# Patient Record
Sex: FEMALE | Race: WHITE | Employment: FULL TIME | URBAN - METROPOLITAN AREA
[De-identification: names, ages, dates, MRNs, and addresses within clinical notes are randomized per-mention and may not be internally consistent; named-entity substitution may affect disease eponyms.]

---

## 2017-06-19 ENCOUNTER — LAB ENCOUNTER (OUTPATIENT)
Dept: LAB | Age: 52
End: 2017-06-19
Attending: INTERNAL MEDICINE
Payer: COMMERCIAL

## 2017-06-19 DIAGNOSIS — R53.83 FATIGUE, UNSPECIFIED TYPE: ICD-10-CM

## 2017-06-19 DIAGNOSIS — E55.9 VITAMIN D DEFICIENCY: ICD-10-CM

## 2017-06-19 DIAGNOSIS — R06.00 DOE (DYSPNEA ON EXERTION): ICD-10-CM

## 2017-06-19 DIAGNOSIS — Z00.00 ANNUAL PHYSICAL EXAM: ICD-10-CM

## 2017-06-19 PROCEDURE — 81001 URINALYSIS AUTO W/SCOPE: CPT

## 2017-06-19 PROCEDURE — 80061 LIPID PANEL: CPT

## 2017-06-19 PROCEDURE — 80053 COMPREHEN METABOLIC PANEL: CPT

## 2017-06-19 PROCEDURE — 85025 COMPLETE CBC W/AUTO DIFF WBC: CPT

## 2017-06-19 PROCEDURE — 36415 COLL VENOUS BLD VENIPUNCTURE: CPT

## 2017-06-19 PROCEDURE — 84443 ASSAY THYROID STIM HORMONE: CPT

## 2017-06-19 PROCEDURE — 82306 VITAMIN D 25 HYDROXY: CPT

## 2017-06-23 ENCOUNTER — OFFICE VISIT (OUTPATIENT)
Dept: INTERNAL MEDICINE CLINIC | Facility: CLINIC | Age: 52
End: 2017-06-23

## 2017-06-23 VITALS
DIASTOLIC BLOOD PRESSURE: 86 MMHG | TEMPERATURE: 98 F | BODY MASS INDEX: 34.09 KG/M2 | SYSTOLIC BLOOD PRESSURE: 150 MMHG | WEIGHT: 190 LBS | HEIGHT: 62.5 IN | HEART RATE: 64 BPM

## 2017-06-23 DIAGNOSIS — G89.29 CHRONIC LEFT-SIDED LOW BACK PAIN WITHOUT SCIATICA: ICD-10-CM

## 2017-06-23 DIAGNOSIS — M54.50 CHRONIC LEFT-SIDED LOW BACK PAIN WITHOUT SCIATICA: ICD-10-CM

## 2017-06-23 DIAGNOSIS — E78.00 HYPERCHOLESTEROLEMIA: Primary | ICD-10-CM

## 2017-06-23 DIAGNOSIS — R53.83 FATIGUE, UNSPECIFIED TYPE: ICD-10-CM

## 2017-06-23 PROCEDURE — 99212 OFFICE O/P EST SF 10 MIN: CPT | Performed by: INTERNAL MEDICINE

## 2017-06-23 PROCEDURE — 99214 OFFICE O/P EST MOD 30 MIN: CPT | Performed by: INTERNAL MEDICINE

## 2017-06-23 NOTE — PROGRESS NOTES
Ad Bryan is a 46year old female. Patient presents with:  Checkup: review labs also left lower back pain    HPI:   Patient presents with:  Checkup: review labs also left lower back pain    Pt feels well.   Pt works as a Physical Therapist.  Pt work tendon reflexes are 1+ bilaterally with an absent left Achilles reflex. ASSESSMENT AND PLAN:   There are no diagnoses linked to this encounter.     Hypercholesterolemia  (primary encounter diagnosis)  Fatigue, unspecified type  Chronic left-sided low back

## 2018-02-04 ENCOUNTER — TELEPHONE (OUTPATIENT)
Dept: INTERNAL MEDICINE CLINIC | Facility: CLINIC | Age: 53
End: 2018-02-04

## 2018-02-04 NOTE — TELEPHONE ENCOUNTER
Telephone call to pt and message left that her Vitamin D level was low at 24. Pt should take Vitamin D 1000 units daily.

## 2018-04-06 ENCOUNTER — OFFICE VISIT (OUTPATIENT)
Dept: OBGYN CLINIC | Facility: CLINIC | Age: 53
End: 2018-04-06

## 2018-04-06 VITALS
HEIGHT: 63 IN | WEIGHT: 188 LBS | HEART RATE: 74 BPM | SYSTOLIC BLOOD PRESSURE: 135 MMHG | DIASTOLIC BLOOD PRESSURE: 87 MMHG | BODY MASS INDEX: 33.31 KG/M2

## 2018-04-06 DIAGNOSIS — Z12.31 ENCOUNTER FOR SCREENING MAMMOGRAM FOR BREAST CANCER: ICD-10-CM

## 2018-04-06 DIAGNOSIS — Z01.419 WELL WOMAN EXAM WITH ROUTINE GYNECOLOGICAL EXAM: Primary | ICD-10-CM

## 2018-04-06 PROCEDURE — 99386 PREV VISIT NEW AGE 40-64: CPT | Performed by: OBSTETRICS & GYNECOLOGY

## 2018-04-06 RX ORDER — BIOTIN 1 MG
1 TABLET ORAL DAILY
COMMUNITY
End: 2018-05-18

## 2018-04-06 NOTE — PROGRESS NOTES
Gerald Ying is a 46year old female  No LMP recorded. Patient has had an ablation. here for annual exam.       Seen [facility] in past.  Last pap . History of TL and Novasure endometrial ablation. No periods. Mild hot flashes.     No gyne is Sitting, Cuff Size: adult)   Pulse 74   Ht 5' 3\" (1.6 m)   Wt 188 lb (85.3 kg)   Breastfeeding? No   BMI 33.30 kg/m²   Wt Readings from Last 2 Encounters:  04/06/18 : 188 lb (85.3 kg)  06/23/17 : 190 lb (86.2 kg)    Body mass index is 33.3 kg/m².     Const

## 2018-04-27 NOTE — PROGRESS NOTES
[facility] [place]  [address] [place] [address]  [address]  [place] [address]  Phone: [phone]  Fax: [phone]    11/14/17    Mason Beatty  [address]  [place] [address]      To whom it may concern:     We recently received a call from your pharmacy requesting a refill of your medication.    A review of your chart indicates that an appointment is required with your provider for an Annual Physical with labs. Your last physical was on 9/13/2016. Please call the clinic at [phone] to schedule your appointment.    We have authorized one refill of your medication to allow time for you to schedule your appointment.    Taking care of your health is important to us, and ongoing visits with your provider are vital to your care.  We look forward to seeing you in the near future.          Sincerely,      Terry Mcmanus MD/ Care Team             Letter sent

## 2018-05-05 ENCOUNTER — HOSPITAL ENCOUNTER (OUTPATIENT)
Dept: MAMMOGRAPHY | Facility: HOSPITAL | Age: 53
Discharge: HOME | End: 2018-05-05
Attending: OBSTETRICS & GYNECOLOGY
Payer: COMMERCIAL

## 2018-05-05 DIAGNOSIS — Z12.31 ENCOUNTER FOR SCREENING MAMMOGRAM FOR BREAST CANCER: ICD-10-CM

## 2018-05-05 PROCEDURE — 77063 BREAST TOMOSYNTHESIS BI: CPT | Performed by: OBSTETRICS & GYNECOLOGY

## 2018-05-05 PROCEDURE — 77067 SCR MAMMO BI INCL CAD: CPT | Performed by: OBSTETRICS & GYNECOLOGY

## 2018-05-17 ENCOUNTER — APPOINTMENT (OUTPATIENT)
Dept: LAB | Age: 53
End: 2018-05-17
Attending: INTERNAL MEDICINE
Payer: COMMERCIAL

## 2018-05-17 DIAGNOSIS — E78.00 HYPERCHOLESTEROLEMIA: ICD-10-CM

## 2018-05-17 PROCEDURE — 36415 COLL VENOUS BLD VENIPUNCTURE: CPT

## 2018-05-17 PROCEDURE — 80061 LIPID PANEL: CPT

## 2018-05-18 ENCOUNTER — OFFICE VISIT (OUTPATIENT)
Dept: INTERNAL MEDICINE CLINIC | Facility: CLINIC | Age: 53
End: 2018-05-18

## 2018-05-18 VITALS
OXYGEN SATURATION: 98 % | DIASTOLIC BLOOD PRESSURE: 86 MMHG | SYSTOLIC BLOOD PRESSURE: 140 MMHG | HEART RATE: 68 BPM | BODY MASS INDEX: 32.96 KG/M2 | HEIGHT: 63 IN | WEIGHT: 186 LBS | TEMPERATURE: 98 F

## 2018-05-18 DIAGNOSIS — Z00.00 ANNUAL PHYSICAL EXAM: ICD-10-CM

## 2018-05-18 DIAGNOSIS — E55.9 VITAMIN D DEFICIENCY: ICD-10-CM

## 2018-05-18 DIAGNOSIS — E78.00 HYPERCHOLESTEROLEMIA: Primary | ICD-10-CM

## 2018-05-18 DIAGNOSIS — R53.83 FATIGUE, UNSPECIFIED TYPE: ICD-10-CM

## 2018-05-18 PROCEDURE — 99212 OFFICE O/P EST SF 10 MIN: CPT | Performed by: INTERNAL MEDICINE

## 2018-05-18 PROCEDURE — 99214 OFFICE O/P EST MOD 30 MIN: CPT | Performed by: INTERNAL MEDICINE

## 2018-05-18 RX ORDER — ATORVASTATIN CALCIUM 10 MG/1
10 TABLET, FILM COATED ORAL NIGHTLY
Qty: 90 TABLET | Refills: 3 | Status: SHIPPED | OUTPATIENT
Start: 2018-05-18 | End: 2019-03-01

## 2018-05-18 RX ORDER — IBUPROFEN 600 MG/1
TABLET ORAL
COMMUNITY
Start: 2018-04-27 | End: 2021-04-05

## 2018-05-18 NOTE — PROGRESS NOTES
Colby Zelaya is a 48year old female. Patient presents with:  Checkup: Results for lipid panel test  Hyperlipidemia    HPI:   Patient presents with:  Checkup: Results for lipid panel test  Hyperlipidemia    Patient feels well.   She is here for follow- distress  HEENT: normal oropharynx, normal TM's. Ears are normal. Eyes are normal  NECK: supple,no lymphadenopathy or masses, no bruits  CHEST: Patient's breasts were not examined today.   LUNGS: clear to auscultation  CARDIO: RRR, normal S1S2, without murm

## 2018-05-18 NOTE — PATIENT INSTRUCTIONS
1.  Patient is to watch her diet more closely and attempt to lose weight. I will give the patient a copy of the American Heart Association diet to assist her. 2.  I will start the patient on Lipitor 10 mg orally which should be taken at night.   3.  José

## 2019-02-15 ENCOUNTER — LAB ENCOUNTER (OUTPATIENT)
Dept: LAB | Age: 54
End: 2019-02-15
Attending: INTERNAL MEDICINE
Payer: COMMERCIAL

## 2019-02-15 DIAGNOSIS — E55.9 VITAMIN D DEFICIENCY: ICD-10-CM

## 2019-02-15 DIAGNOSIS — E78.00 HYPERCHOLESTEROLEMIA: ICD-10-CM

## 2019-02-15 DIAGNOSIS — Z00.00 ANNUAL PHYSICAL EXAM: ICD-10-CM

## 2019-02-15 DIAGNOSIS — R53.83 FATIGUE, UNSPECIFIED TYPE: ICD-10-CM

## 2019-02-15 LAB
ALBUMIN SERPL-MCNC: 3.9 G/DL (ref 3.4–5)
ALBUMIN/GLOB SERPL: 1.1 {RATIO} (ref 1–2)
ALP LIVER SERPL-CCNC: 76 U/L (ref 41–108)
ALT SERPL-CCNC: 31 U/L (ref 13–56)
ANION GAP SERPL CALC-SCNC: 7 MMOL/L (ref 0–18)
AST SERPL-CCNC: 16 U/L (ref 15–37)
BACTERIA UR QL AUTO: NEGATIVE /HPF
BASOPHILS # BLD AUTO: 0.04 X10(3) UL (ref 0–0.2)
BASOPHILS NFR BLD AUTO: 0.6 %
BILIRUB SERPL-MCNC: 0.4 MG/DL (ref 0.1–2)
BILIRUB UR QL: NEGATIVE
BUN BLD-MCNC: 23 MG/DL (ref 7–18)
BUN/CREAT SERPL: 27.4 (ref 10–20)
CALCIUM BLD-MCNC: 9.2 MG/DL (ref 8.5–10.1)
CHLORIDE SERPL-SCNC: 107 MMOL/L (ref 98–107)
CHOLEST SMN-MCNC: 258 MG/DL (ref ?–200)
CLARITY UR: CLEAR
CO2 SERPL-SCNC: 27 MMOL/L (ref 21–32)
COLOR UR: YELLOW
CREAT BLD-MCNC: 0.84 MG/DL (ref 0.55–1.02)
DEPRECATED RDW RBC AUTO: 43.8 FL (ref 35.1–46.3)
EOSINOPHIL # BLD AUTO: 0.1 X10(3) UL (ref 0–0.7)
EOSINOPHIL NFR BLD AUTO: 1.4 %
ERYTHROCYTE [DISTWIDTH] IN BLOOD BY AUTOMATED COUNT: 13.1 % (ref 11–15)
GLOBULIN PLAS-MCNC: 3.6 G/DL (ref 2.8–4.4)
GLUCOSE BLD-MCNC: 89 MG/DL (ref 70–99)
GLUCOSE UR-MCNC: NEGATIVE MG/DL
HCT VFR BLD AUTO: 43.2 % (ref 35–48)
HDLC SERPL-MCNC: 122 MG/DL (ref 40–59)
HGB BLD-MCNC: 14 G/DL (ref 12–16)
HGB UR QL STRIP.AUTO: NEGATIVE
HYALINE CASTS #/AREA URNS AUTO: 1 /LPF
IMM GRANULOCYTES # BLD AUTO: 0.02 X10(3) UL (ref 0–1)
IMM GRANULOCYTES NFR BLD: 0.3 %
KETONES UR-MCNC: NEGATIVE MG/DL
LDLC SERPL CALC-MCNC: 123 MG/DL (ref ?–100)
LYMPHOCYTES # BLD AUTO: 1.89 X10(3) UL (ref 1–4)
LYMPHOCYTES NFR BLD AUTO: 27 %
M PROTEIN MFR SERPL ELPH: 7.5 G/DL (ref 6.4–8.2)
MCH RBC QN AUTO: 29.6 PG (ref 26–34)
MCHC RBC AUTO-ENTMCNC: 32.4 G/DL (ref 31–37)
MCV RBC AUTO: 91.3 FL (ref 80–100)
MONOCYTES # BLD AUTO: 0.5 X10(3) UL (ref 0.1–1)
MONOCYTES NFR BLD AUTO: 7.2 %
NEUTROPHILS # BLD AUTO: 4.44 X10 (3) UL (ref 1.5–7.7)
NEUTROPHILS # BLD AUTO: 4.44 X10(3) UL (ref 1.5–7.7)
NEUTROPHILS NFR BLD AUTO: 63.5 %
NITRITE UR QL STRIP.AUTO: NEGATIVE
NONHDLC SERPL-MCNC: 136 MG/DL (ref ?–130)
OSMOLALITY SERPL CALC.SUM OF ELEC: 295 MOSM/KG (ref 275–295)
PH UR: 5 [PH] (ref 5–8)
PLATELET # BLD AUTO: 300 10(3)UL (ref 150–450)
POTASSIUM SERPL-SCNC: 4.7 MMOL/L (ref 3.5–5.1)
PROT UR-MCNC: NEGATIVE MG/DL
RBC # BLD AUTO: 4.73 X10(6)UL (ref 3.8–5.3)
RBC #/AREA URNS AUTO: 0 /HPF
SODIUM SERPL-SCNC: 141 MMOL/L (ref 136–145)
SP GR UR STRIP: 1.03 (ref 1–1.03)
TRIGL SERPL-MCNC: 63 MG/DL (ref 30–149)
TSI SER-ACNC: 1.16 MIU/ML (ref 0.36–3.74)
UROBILINOGEN UR STRIP-ACNC: <2
VIT C UR-MCNC: NEGATIVE MG/DL
WBC # BLD AUTO: 7 X10(3) UL (ref 4–11)
WBC #/AREA URNS AUTO: 1 /HPF

## 2019-02-15 PROCEDURE — 36415 COLL VENOUS BLD VENIPUNCTURE: CPT

## 2019-02-15 PROCEDURE — 81001 URINALYSIS AUTO W/SCOPE: CPT

## 2019-02-15 PROCEDURE — 80061 LIPID PANEL: CPT

## 2019-02-15 PROCEDURE — 82306 VITAMIN D 25 HYDROXY: CPT

## 2019-02-15 PROCEDURE — 84443 ASSAY THYROID STIM HORMONE: CPT

## 2019-02-15 PROCEDURE — 85025 COMPLETE CBC W/AUTO DIFF WBC: CPT

## 2019-02-15 PROCEDURE — 80053 COMPREHEN METABOLIC PANEL: CPT

## 2019-02-18 LAB — 25(OH)D3 SERPL-MCNC: 25 NG/ML (ref 30–100)

## 2019-03-01 ENCOUNTER — OFFICE VISIT (OUTPATIENT)
Dept: INTERNAL MEDICINE CLINIC | Facility: CLINIC | Age: 54
End: 2019-03-01
Payer: COMMERCIAL

## 2019-03-01 VITALS
WEIGHT: 189 LBS | SYSTOLIC BLOOD PRESSURE: 130 MMHG | BODY MASS INDEX: 33.49 KG/M2 | OXYGEN SATURATION: 98 % | TEMPERATURE: 98 F | HEIGHT: 63 IN | HEART RATE: 72 BPM | DIASTOLIC BLOOD PRESSURE: 86 MMHG

## 2019-03-01 DIAGNOSIS — E78.00 HYPERCHOLESTEROLEMIA: Primary | ICD-10-CM

## 2019-03-01 DIAGNOSIS — R07.9 CHEST PAIN, UNSPECIFIED TYPE: ICD-10-CM

## 2019-03-01 DIAGNOSIS — E55.9 VITAMIN D DEFICIENCY: ICD-10-CM

## 2019-03-01 PROCEDURE — 99214 OFFICE O/P EST MOD 30 MIN: CPT | Performed by: INTERNAL MEDICINE

## 2019-03-01 PROCEDURE — 99212 OFFICE O/P EST SF 10 MIN: CPT | Performed by: INTERNAL MEDICINE

## 2019-03-01 PROCEDURE — 93005 ELECTROCARDIOGRAM TRACING: CPT | Performed by: INTERNAL MEDICINE

## 2019-03-01 PROCEDURE — 93000 ELECTROCARDIOGRAM COMPLETE: CPT | Performed by: INTERNAL MEDICINE

## 2019-03-01 NOTE — PROGRESS NOTES
eGrald Ying is a 48year old female. Patient presents with:  Checkup: LOV 5/18/18. Labs completed 2/15. To review lab results and discuss diet changes. Pt realized she is sensitive to bread & dairy and has since cut this out of her diet.    Hyperlipid by mouth daily. Disp: 100 tablet Rfl: 3      Past Medical History:   Diagnosis Date   • Menometrorrhagia 2010      Social History:  Social History    Tobacco Use      Smoking status: Never Smoker      Smokeless tobacco: Never Used    Alcohol use:  Yes activity. I will see the patient back in 1 year. 2. Vitamin D deficiency  Patient's vitamin D level is low at 25. Patient has been taking vitamin D 1000 units daily. I will have her increase her vitamin D to 2000 units orally daily.     3.  Chest pain

## 2019-03-01 NOTE — PATIENT INSTRUCTIONS
1.  She is to continue her current diet, medication and activity. 2.  Patient's EKG is normal today. Patient's cardiovascular risk calculation shows her to have a 1.2% probability of a cardiovascular event in the next 10 years. This is a low percentage.

## 2021-03-06 ENCOUNTER — IMMUNIZATION (OUTPATIENT)
Dept: LAB | Facility: HOSPITAL | Age: 56
End: 2021-03-06
Attending: EMERGENCY MEDICINE

## 2021-03-06 DIAGNOSIS — Z23 NEED FOR VACCINATION: Primary | ICD-10-CM

## 2021-03-06 PROCEDURE — 0011A SARSCOV2 VAC 100MCG/0.5ML IM: CPT

## 2021-04-03 ENCOUNTER — IMMUNIZATION (OUTPATIENT)
Dept: LAB | Facility: HOSPITAL | Age: 56
End: 2021-04-03
Attending: EMERGENCY MEDICINE

## 2021-04-03 DIAGNOSIS — Z23 NEED FOR VACCINATION: Primary | ICD-10-CM

## 2021-04-03 PROCEDURE — 0012A SARSCOV2 VAC 100MCG/0.5ML IM: CPT

## 2021-04-05 ENCOUNTER — OFFICE VISIT (OUTPATIENT)
Dept: INTERNAL MEDICINE CLINIC | Facility: CLINIC | Age: 56
End: 2021-04-05
Payer: COMMERCIAL

## 2021-04-05 VITALS
OXYGEN SATURATION: 98 % | HEIGHT: 63 IN | SYSTOLIC BLOOD PRESSURE: 130 MMHG | HEART RATE: 108 BPM | TEMPERATURE: 98 F | BODY MASS INDEX: 32.67 KG/M2 | DIASTOLIC BLOOD PRESSURE: 80 MMHG | WEIGHT: 184.38 LBS

## 2021-04-05 DIAGNOSIS — E78.00 HYPERCHOLESTEROLEMIA: Primary | ICD-10-CM

## 2021-04-05 DIAGNOSIS — Z00.00 ANNUAL PHYSICAL EXAM: ICD-10-CM

## 2021-04-05 PROCEDURE — 3075F SYST BP GE 130 - 139MM HG: CPT | Performed by: INTERNAL MEDICINE

## 2021-04-05 PROCEDURE — 99214 OFFICE O/P EST MOD 30 MIN: CPT | Performed by: INTERNAL MEDICINE

## 2021-04-05 PROCEDURE — 3079F DIAST BP 80-89 MM HG: CPT | Performed by: INTERNAL MEDICINE

## 2021-04-05 PROCEDURE — 3008F BODY MASS INDEX DOCD: CPT | Performed by: INTERNAL MEDICINE

## 2021-04-05 RX ORDER — IBUPROFEN 200 MG
200 TABLET ORAL EVERY 6 HOURS PRN
COMMUNITY

## 2021-04-05 NOTE — PATIENT INSTRUCTIONS
1.  Patient is to continue her current diet, medication and activity. 2.  Patient is to watch her diet more closely. 3.  I will see the patient back in 1 to 2 months with blood tests, urinalysis and EKG for her annual physical examination.   4.  Patient i

## 2021-04-05 NOTE — PROGRESS NOTES
Shakira Grove is a 54year old female. Patient presents with:  Checkup: health screening done through employer 3/2021-BP elevated  Hyperlipidemia    HPI:   Patient presents with:  Checkup: health screening done through employer 3/2021-BP elevated  Hype Location: Right arm, Patient Position: Sitting, Cuff Size: large)   Pulse 108   Temp 98.4 °F (36.9 °C) (Oral)   Ht 5' 3\" (1.6 m)   Wt 184 lb 6.4 oz (83.6 kg)   SpO2 98%   BMI 32.66 kg/m²   GENERAL: well developed, well nourished in no acute distress  HEEN

## 2021-05-26 ENCOUNTER — OFFICE VISIT (OUTPATIENT)
Dept: OBGYN CLINIC | Facility: CLINIC | Age: 56
End: 2021-05-26
Payer: COMMERCIAL

## 2021-05-26 VITALS
SYSTOLIC BLOOD PRESSURE: 134 MMHG | WEIGHT: 182 LBS | BODY MASS INDEX: 32 KG/M2 | HEART RATE: 109 BPM | DIASTOLIC BLOOD PRESSURE: 86 MMHG

## 2021-05-26 DIAGNOSIS — Z12.4 SCREENING FOR MALIGNANT NEOPLASM OF CERVIX: ICD-10-CM

## 2021-05-26 DIAGNOSIS — Z12.31 SCREENING MAMMOGRAM, ENCOUNTER FOR: ICD-10-CM

## 2021-05-26 DIAGNOSIS — Z01.419 ENCOUNTER FOR GYNECOLOGICAL EXAMINATION WITHOUT ABNORMAL FINDING: Primary | ICD-10-CM

## 2021-05-26 PROCEDURE — 99386 PREV VISIT NEW AGE 40-64: CPT | Performed by: OBSTETRICS & GYNECOLOGY

## 2021-05-26 PROCEDURE — 3075F SYST BP GE 130 - 139MM HG: CPT | Performed by: OBSTETRICS & GYNECOLOGY

## 2021-05-26 PROCEDURE — 3079F DIAST BP 80-89 MM HG: CPT | Performed by: OBSTETRICS & GYNECOLOGY

## 2021-06-03 NOTE — PROGRESS NOTES
HPI/Subjective:   Patient ID: Samir Joshi is a 64year old female. HPI   ( Davi passed in  ). She is amenorrheic post ablation in . No new family or personal medical issues. Scheduled for colonoscopy with Dr Janie Contreras.  She does cardio Tenderness: There is no abdominal tenderness. There is no guarding or rebound. Genitourinary:     Labia:         Right: No rash or lesion. Left: No rash or lesion. Vagina: Normal. No vaginal discharge.       Cervix: No cervical motion t

## 2021-06-08 ENCOUNTER — APPOINTMENT (OUTPATIENT)
Dept: GENERAL RADIOLOGY | Age: 56
End: 2021-06-08
Attending: EMERGENCY MEDICINE
Payer: COMMERCIAL

## 2021-06-08 ENCOUNTER — HOSPITAL ENCOUNTER (OUTPATIENT)
Age: 56
Discharge: HOME | End: 2021-06-08
Payer: COMMERCIAL

## 2021-06-08 ENCOUNTER — TELEPHONE (OUTPATIENT)
Dept: INTERNAL MEDICINE CLINIC | Facility: CLINIC | Age: 56
End: 2021-06-08

## 2021-06-08 VITALS
HEART RATE: 83 BPM | DIASTOLIC BLOOD PRESSURE: 88 MMHG | SYSTOLIC BLOOD PRESSURE: 142 MMHG | TEMPERATURE: 97 F | OXYGEN SATURATION: 98 % | RESPIRATION RATE: 18 BRPM

## 2021-06-08 DIAGNOSIS — S89.92XA INJURY OF LEFT KNEE, INITIAL ENCOUNTER: Primary | ICD-10-CM

## 2021-06-08 DIAGNOSIS — M79.662 PAIN IN LEFT SHIN: ICD-10-CM

## 2021-06-08 PROCEDURE — 99204 OFFICE O/P NEW MOD 45 MIN: CPT

## 2021-06-08 PROCEDURE — 99214 OFFICE O/P EST MOD 30 MIN: CPT

## 2021-06-08 PROCEDURE — 73590 X-RAY EXAM OF LOWER LEG: CPT | Performed by: EMERGENCY MEDICINE

## 2021-06-08 PROCEDURE — 73560 X-RAY EXAM OF KNEE 1 OR 2: CPT | Performed by: EMERGENCY MEDICINE

## 2021-06-08 RX ORDER — METHOCARBAMOL 500 MG/1
500 TABLET, FILM COATED ORAL 3 TIMES DAILY PRN
Qty: 14 TABLET | Refills: 0 | Status: SHIPPED | OUTPATIENT
Start: 2021-06-08 | End: 2021-07-07 | Stop reason: ALTCHOICE

## 2021-06-08 NOTE — TELEPHONE ENCOUNTER
Pt was walking dog and the dog pulled her the wrong way  which caused her to twist knee. Pt states that it is very painful. Pt cant extend her knee and she is not able to put any weight on it. She does not think it is fractured.      Please call and ad

## 2021-06-08 NOTE — ED PROVIDER NOTES
Patient Seen in: [facility]      History   Patient presents with:  Leg or Foot Injury    Stated Complaint: leg pain    HPI/Subjective:   Paula Lazo is a 64year old female here for left knee pain after twisting her knee yesterday Appearance: Normal appearance. She is not ill-appearing. HENT:      Head: Normocephalic. Eyes:      Conjunctiva/sclera: Conjunctivae normal.      Pupils: Pupils are equal, round, and reactive to light.    Pulmonary:      Effort: Pulmonary effort is norm osteoarthritis. Patient does not currently demonstrate complications of pain such as Fx, compartment syndrome, arterial injury, or nerve injury. Splint: Knee immobilizer left knee  Crutches: Patient has at home  Medication: Robaxin, and OTC medications.

## 2021-06-08 NOTE — TELEPHONE ENCOUNTER
Spoke with patient. She reports she twisted her left knee inward while walking her dog. Feels pain to medial kneecap. Reports passive ROM is intact, but she has pain with active ROM, dorsiflexion, and rotating knee medially and laterally.      Recommend UC

## 2021-06-08 NOTE — ED INITIAL ASSESSMENT (HPI)
Pt presents with pain on left knee x 2 days due to injury.  Pt states she injured knee while walking dog yesterday

## 2021-06-09 NOTE — TELEPHONE ENCOUNTER
Dr. Du Bertrand, patient was seen in urgent care in [place] yesterday and prescribed methocarbamol. X-rays were completed. She is scheduled for follow up on July 7. Please advise if a sooner appointment is needed. Thank you.

## 2021-06-09 NOTE — TELEPHONE ENCOUNTER
Telephone call to patient and situation discussed. I told the patient that based on her pain, swelling, and the fact that there appears to be an effusion in her knee I am concerned that she may have torn something such as a cartilage inside her knee.   I i

## 2021-06-30 ENCOUNTER — HOSPITAL ENCOUNTER (OUTPATIENT)
Dept: MAMMOGRAPHY | Age: 56
Discharge: HOME | End: 2021-06-30
Attending: OBSTETRICS & GYNECOLOGY
Payer: COMMERCIAL

## 2021-06-30 DIAGNOSIS — Z12.31 SCREENING MAMMOGRAM, ENCOUNTER FOR: ICD-10-CM

## 2021-06-30 PROCEDURE — 77067 SCR MAMMO BI INCL CAD: CPT | Performed by: OBSTETRICS & GYNECOLOGY

## 2021-06-30 PROCEDURE — 77063 BREAST TOMOSYNTHESIS BI: CPT | Performed by: OBSTETRICS & GYNECOLOGY

## 2021-07-03 ENCOUNTER — LAB ENCOUNTER (OUTPATIENT)
Dept: LAB | Age: 56
End: 2021-07-03
Attending: INTERNAL MEDICINE
Payer: COMMERCIAL

## 2021-07-03 DIAGNOSIS — E78.00 HYPERCHOLESTEROLEMIA: ICD-10-CM

## 2021-07-03 DIAGNOSIS — Z00.00 ANNUAL PHYSICAL EXAM: ICD-10-CM

## 2021-07-03 LAB
ALBUMIN SERPL-MCNC: 3.8 G/DL (ref 3.4–5)
ALBUMIN/GLOB SERPL: 1 {RATIO} (ref 1–2)
ALP LIVER SERPL-CCNC: 79 U/L
ALT SERPL-CCNC: 33 U/L
ANION GAP SERPL CALC-SCNC: 7 MMOL/L (ref 0–18)
AST SERPL-CCNC: 22 U/L (ref 15–37)
BASOPHILS # BLD AUTO: 0.03 X10(3) UL (ref 0–0.2)
BASOPHILS NFR BLD AUTO: 0.6 %
BILIRUB SERPL-MCNC: 0.7 MG/DL (ref 0.1–2)
BILIRUB UR QL: NEGATIVE
BUN BLD-MCNC: 27 MG/DL (ref 7–18)
BUN/CREAT SERPL: 32.9 (ref 10–20)
CALCIUM BLD-MCNC: 9.9 MG/DL (ref 8.5–10.1)
CHLORIDE SERPL-SCNC: 103 MMOL/L (ref 98–112)
CHOLEST SMN-MCNC: 289 MG/DL (ref ?–200)
CLARITY UR: CLEAR
CO2 SERPL-SCNC: 28 MMOL/L (ref 21–32)
COLOR UR: YELLOW
CREAT BLD-MCNC: 0.82 MG/DL
DEPRECATED RDW RBC AUTO: 45.1 FL (ref 35.1–46.3)
EOSINOPHIL # BLD AUTO: 0.09 X10(3) UL (ref 0–0.7)
EOSINOPHIL NFR BLD AUTO: 1.8 %
ERYTHROCYTE [DISTWIDTH] IN BLOOD BY AUTOMATED COUNT: 13.4 % (ref 11–15)
GLOBULIN PLAS-MCNC: 3.9 G/DL (ref 2.8–4.4)
GLUCOSE BLD-MCNC: 88 MG/DL (ref 70–99)
GLUCOSE UR-MCNC: NEGATIVE MG/DL
HCT VFR BLD AUTO: 42.5 %
HDLC SERPL-MCNC: 103 MG/DL (ref 40–59)
HGB BLD-MCNC: 13.5 G/DL
HGB UR QL STRIP.AUTO: NEGATIVE
IMM GRANULOCYTES # BLD AUTO: 0.01 X10(3) UL (ref 0–1)
IMM GRANULOCYTES NFR BLD: 0.2 %
KETONES UR-MCNC: NEGATIVE MG/DL
LDLC SERPL CALC-MCNC: 172 MG/DL (ref ?–100)
LYMPHOCYTES # BLD AUTO: 2.03 X10(3) UL (ref 1–4)
LYMPHOCYTES NFR BLD AUTO: 39.6 %
M PROTEIN MFR SERPL ELPH: 7.7 G/DL (ref 6.4–8.2)
MCH RBC QN AUTO: 29.2 PG (ref 26–34)
MCHC RBC AUTO-ENTMCNC: 31.8 G/DL (ref 31–37)
MCV RBC AUTO: 91.8 FL
MONOCYTES # BLD AUTO: 0.51 X10(3) UL (ref 0.1–1)
MONOCYTES NFR BLD AUTO: 10 %
NEUTROPHILS # BLD AUTO: 2.45 X10 (3) UL (ref 1.5–7.7)
NEUTROPHILS # BLD AUTO: 2.45 X10(3) UL (ref 1.5–7.7)
NEUTROPHILS NFR BLD AUTO: 47.8 %
NITRITE UR QL STRIP.AUTO: NEGATIVE
NONHDLC SERPL-MCNC: 186 MG/DL (ref ?–130)
OSMOLALITY SERPL CALC.SUM OF ELEC: 291 MOSM/KG (ref 275–295)
PATIENT FASTING Y/N/NP: YES
PATIENT FASTING Y/N/NP: YES
PH UR: 7 [PH] (ref 5–8)
PLATELET # BLD AUTO: 304 10(3)UL (ref 150–450)
POTASSIUM SERPL-SCNC: 4 MMOL/L (ref 3.5–5.1)
PROT UR-MCNC: NEGATIVE MG/DL
RBC # BLD AUTO: 4.63 X10(6)UL
SODIUM SERPL-SCNC: 138 MMOL/L (ref 136–145)
SP GR UR STRIP: 1.02 (ref 1–1.03)
TRIGL SERPL-MCNC: 88 MG/DL (ref 30–149)
TSI SER-ACNC: 1.79 MIU/ML (ref 0.36–3.74)
UROBILINOGEN UR STRIP-ACNC: <2
VLDLC SERPL CALC-MCNC: 17 MG/DL (ref 0–30)
WBC # BLD AUTO: 5.1 X10(3) UL (ref 4–11)

## 2021-07-03 PROCEDURE — 85025 COMPLETE CBC W/AUTO DIFF WBC: CPT

## 2021-07-03 PROCEDURE — 81001 URINALYSIS AUTO W/SCOPE: CPT | Performed by: INTERNAL MEDICINE

## 2021-07-03 PROCEDURE — 36415 COLL VENOUS BLD VENIPUNCTURE: CPT

## 2021-07-03 PROCEDURE — 80061 LIPID PANEL: CPT

## 2021-07-03 PROCEDURE — 82306 VITAMIN D 25 HYDROXY: CPT

## 2021-07-03 PROCEDURE — 80053 COMPREHEN METABOLIC PANEL: CPT

## 2021-07-03 PROCEDURE — 84443 ASSAY THYROID STIM HORMONE: CPT

## 2021-07-05 LAB — 25(OH)D3 SERPL-MCNC: 27.3 NG/ML (ref 30–100)

## 2021-07-07 ENCOUNTER — OFFICE VISIT (OUTPATIENT)
Dept: INTERNAL MEDICINE CLINIC | Facility: CLINIC | Age: 56
End: 2021-07-07
Payer: COMMERCIAL

## 2021-07-07 VITALS
OXYGEN SATURATION: 99 % | DIASTOLIC BLOOD PRESSURE: 74 MMHG | SYSTOLIC BLOOD PRESSURE: 126 MMHG | HEART RATE: 92 BPM | WEIGHT: 179 LBS | HEIGHT: 63 IN | BODY MASS INDEX: 31.71 KG/M2

## 2021-07-07 DIAGNOSIS — E78.00 HYPERCHOLESTEROLEMIA: ICD-10-CM

## 2021-07-07 DIAGNOSIS — E55.9 VITAMIN D DEFICIENCY: ICD-10-CM

## 2021-07-07 DIAGNOSIS — Z00.00 ANNUAL PHYSICAL EXAM: Primary | ICD-10-CM

## 2021-07-07 PROCEDURE — 3074F SYST BP LT 130 MM HG: CPT | Performed by: INTERNAL MEDICINE

## 2021-07-07 PROCEDURE — 3008F BODY MASS INDEX DOCD: CPT | Performed by: INTERNAL MEDICINE

## 2021-07-07 PROCEDURE — 99213 OFFICE O/P EST LOW 20 MIN: CPT | Performed by: INTERNAL MEDICINE

## 2021-07-07 PROCEDURE — 99396 PREV VISIT EST AGE 40-64: CPT | Performed by: INTERNAL MEDICINE

## 2021-07-07 PROCEDURE — 3078F DIAST BP <80 MM HG: CPT | Performed by: INTERNAL MEDICINE

## 2021-07-07 RX ORDER — ATORVASTATIN CALCIUM 20 MG/1
20 TABLET, FILM COATED ORAL NIGHTLY
Qty: 90 TABLET | Refills: 3 | Status: SHIPPED | OUTPATIENT
Start: 2021-07-07 | End: 2022-07-07

## 2021-07-07 RX ORDER — VITAMIN B COMPLEX
50 TABLET ORAL DAILY
Qty: 100 TABLET | Refills: 11 | Status: SHIPPED | OUTPATIENT
Start: 2021-07-07 | End: 2021-08-06

## 2021-07-07 NOTE — PROGRESS NOTES
HPI:   Po Arora is a 64year old female who was seen by me for her annual physical examination on July 7, 2021. 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  She feels well. Never used    Alcohol use: Yes      Comment: 1to 3 glass of wine in a week    Drug use: No         REVIEW OF SYSTEMS:   GENERAL: feels well   SKIN: denies any unusual skin lesions  EYES:denies blurred vision or double vision  HEENT: denies nasal congestion to continue her current diet, medication and activity. Patient's cholesterol readings are again elevated. I have given the patient a copy of the American Heart Association diet to assist with her diet.   We also discussed the rationale of placing the edwin

## 2021-07-07 NOTE — PATIENT INSTRUCTIONS
1.  Patient is to continue her current diet, medication and activity. 2.  I will give the patient a copy of the American Heart Association diet to assist with her diet.   3.  We will start the patient Lipitor 20 mg orally to be taken in the evening/at bedt

## 2021-07-12 ENCOUNTER — TELEPHONE (OUTPATIENT)
Dept: GASTROENTEROLOGY | Facility: CLINIC | Age: 56
End: 2021-07-12

## 2021-07-12 ENCOUNTER — OFFICE VISIT (OUTPATIENT)
Dept: GASTROENTEROLOGY | Facility: CLINIC | Age: 56
End: 2021-07-12
Payer: COMMERCIAL

## 2021-07-12 VITALS
BODY MASS INDEX: 31.89 KG/M2 | HEART RATE: 98 BPM | SYSTOLIC BLOOD PRESSURE: 139 MMHG | HEIGHT: 63 IN | DIASTOLIC BLOOD PRESSURE: 90 MMHG | WEIGHT: 180 LBS

## 2021-07-12 DIAGNOSIS — Z12.11 COLON CANCER SCREENING: Primary | ICD-10-CM

## 2021-07-12 DIAGNOSIS — K21.9 GASTROESOPHAGEAL REFLUX DISEASE, UNSPECIFIED WHETHER ESOPHAGITIS PRESENT: ICD-10-CM

## 2021-07-12 DIAGNOSIS — Z12.11 SCREENING FOR COLORECTAL CANCER: Primary | ICD-10-CM

## 2021-07-12 DIAGNOSIS — Z12.12 SCREENING FOR COLORECTAL CANCER: Primary | ICD-10-CM

## 2021-07-12 PROCEDURE — 3080F DIAST BP >= 90 MM HG: CPT | Performed by: INTERNAL MEDICINE

## 2021-07-12 PROCEDURE — 3008F BODY MASS INDEX DOCD: CPT | Performed by: INTERNAL MEDICINE

## 2021-07-12 PROCEDURE — S0285 CNSLT BEFORE SCREEN COLONOSC: HCPCS | Performed by: INTERNAL MEDICINE

## 2021-07-12 PROCEDURE — 3075F SYST BP GE 130 - 139MM HG: CPT | Performed by: INTERNAL MEDICINE

## 2021-07-12 NOTE — PATIENT INSTRUCTIONS
1.  Schedule screening colonoscopy following a split dose MiraLAX/Gatorade preparation and either IV sedation or monitored anesthesia care per scheduling. 2.  Dietary and lifestyle modification for reflux as you are doing.

## 2021-07-12 NOTE — PROGRESS NOTES
HPI/Subjective:   Patient ID: Marvin Hunter is a 64year old female. HPI  The patient is seen at the request of Dr. Dixie Beatty for colorectal cancer screening. She has not had a prior colonoscopy. The patient states that she feels \"good\". appearance. She is well-developed. She is not ill-appearing or toxic-appearing. HENT:      Head: Normocephalic and atraumatic. Mouth/Throat:      Pharynx: No oropharyngeal exudate. Eyes:      General: No scleral icterus.      Conjunctiva/sclera: Co Basophils %      % 0.6   Immature Granulocyte %      % 0.2   Glucose      70 - 99 mg/dL 88   Sodium      136 - 145 mmol/L 138   Potassium      3.5 - 5.1 mmol/L 4.0   Chloride      98 - 112 mmol/L 103   Carbon Dioxide, Total      21.0 - 32.0 mmol/L 28.0 complications of reflux including erosive esophagitis, stricture or Velazquez's esophagus. The patient's risk for Velazquez's esophagus is low.   Upper endoscopy is not mandatory unless symptoms are refractory or alarm symptoms are noted which is not the case

## 2021-07-12 NOTE — TELEPHONE ENCOUNTER
Scheduled for: Colonoscopy 09888  Provider Name: Dr Ajay Terrazas   Date:  Mon 11/01/2021    Location: [facility]   Sedation: IV   Time: 10:30 am   Prep: split Miralax   Meds/Allergies Reconciled?: Latex  Diagnosis with codes: screening Z12.11  Was patient informed

## 2021-10-14 ENCOUNTER — NURSE ONLY (OUTPATIENT)
Dept: LAB | Facility: HOSPITAL | Age: 56
End: 2021-10-14
Attending: PREVENTIVE MEDICINE

## 2021-10-14 ENCOUNTER — TELEPHONE (OUTPATIENT)
Dept: INTERNAL MEDICINE CLINIC | Facility: HOSPITAL | Age: 56
End: 2021-10-14

## 2021-10-14 DIAGNOSIS — Z20.822 SUSPECTED COVID-19 VIRUS INFECTION: ICD-10-CM

## 2021-10-14 DIAGNOSIS — Z20.822 SUSPECTED COVID-19 VIRUS INFECTION: Primary | ICD-10-CM

## 2021-10-14 NOTE — TELEPHONE ENCOUNTER
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 10/19/2021    Did you have close contact with someone on your unit while not wearing a mask? (e.g., during meal breaks):  Yes []   No [x]    If yes, who:   Do you share a workspace? Yes [x]   No []       If yes, with whom?   Clean area, wear PPE  Do y COVID-19 testing ordered: [x] Rapid    [] [employer]    Date test is to be taken:    10/14/2021    []  Outside testing       [x] Manager notified    INSTRUCTIONS PROVIDED:    [x] Employee will schedule testing via [facility]

## 2021-10-14 NOTE — TELEPHONE ENCOUNTER
Results and RTW guidelines:    COVID RESULT:    [x] Viewed by employee in [address] RTW plan and instructions as indicated on triage call. Manager notified. Estimated RTW date:   [] Discussed with employee   [] Unable to reach by phone.   Sent via [employer] RTW PLAN:    [] RTW 10 days with clearance from [facility]- call for appt 1-2 days prior to RTW date OR when feeling well enough to RTW (see guidelines above)  [x] RTW immediately, continue to monitor for sx  [] RTW when sx improve; must be fever free for 24 h

## 2021-10-27 RX ORDER — SODIUM CHLORIDE 0.9 % (FLUSH) 0.9 %
10 SYRINGE (ML) INJECTION AS NEEDED
Status: CANCELLED | OUTPATIENT
Start: 2021-10-27

## 2021-10-27 RX ORDER — SODIUM CHLORIDE, SODIUM LACTATE, POTASSIUM CHLORIDE, CALCIUM CHLORIDE 600; 310; 30; 20 MG/100ML; MG/100ML; MG/100ML; MG/100ML
INJECTION, SOLUTION INTRAVENOUS CONTINUOUS
Status: CANCELLED | OUTPATIENT
Start: 2021-10-27

## 2021-10-27 RX ORDER — MIDAZOLAM HYDROCHLORIDE 1 MG/ML
1 INJECTION INTRAMUSCULAR; INTRAVENOUS EVERY 5 MIN PRN
Status: CANCELLED | OUTPATIENT
Start: 2021-10-27

## 2021-10-27 NOTE — PAT NURSING NOTE
Medications, procedure date/arrival time,  requirements and [facility] location reviewed with patient. Address provided. .Patient verbalizes understanding.

## 2021-10-29 ENCOUNTER — LAB ENCOUNTER (OUTPATIENT)
Dept: LAB | Facility: HOSPITAL | Age: 56
End: 2021-10-29
Attending: INTERNAL MEDICINE
Payer: COMMERCIAL

## 2021-10-29 DIAGNOSIS — Z01.818 PRE-OP TESTING: ICD-10-CM

## 2021-11-01 ENCOUNTER — ANESTHESIA EVENT (OUTPATIENT)
Dept: ENDOSCOPY | Facility: HOSPITAL | Age: 56
End: 2021-11-01
Payer: COMMERCIAL

## 2021-11-01 ENCOUNTER — HOSPITAL ENCOUNTER (OUTPATIENT)
Facility: HOSPITAL | Age: 56
Setting detail: HOSPITAL OUTPATIENT SURGERY
Discharge: HOME | End: 2021-11-01
Attending: INTERNAL MEDICINE | Admitting: INTERNAL MEDICINE
Payer: COMMERCIAL

## 2021-11-01 ENCOUNTER — ANESTHESIA (OUTPATIENT)
Dept: ENDOSCOPY | Facility: HOSPITAL | Age: 56
End: 2021-11-01
Payer: COMMERCIAL

## 2021-11-01 VITALS
WEIGHT: 170 LBS | TEMPERATURE: 98 F | HEART RATE: 79 BPM | OXYGEN SATURATION: 98 % | DIASTOLIC BLOOD PRESSURE: 88 MMHG | RESPIRATION RATE: 22 BRPM | HEIGHT: 66 IN | SYSTOLIC BLOOD PRESSURE: 124 MMHG | BODY MASS INDEX: 27.32 KG/M2

## 2021-11-01 DIAGNOSIS — Z12.11 COLON CANCER SCREENING: ICD-10-CM

## 2021-11-01 DIAGNOSIS — Z01.818 PRE-OP TESTING: Primary | ICD-10-CM

## 2021-11-01 PROCEDURE — 0DBN8ZX EXCISION OF SIGMOID COLON, VIA NATURAL OR ARTIFICIAL OPENING ENDOSCOPIC, DIAGNOSTIC: ICD-10-PCS | Performed by: INTERNAL MEDICINE

## 2021-11-01 PROCEDURE — 0DBK8ZX EXCISION OF ASCENDING COLON, VIA NATURAL OR ARTIFICIAL OPENING ENDOSCOPIC, DIAGNOSTIC: ICD-10-PCS | Performed by: INTERNAL MEDICINE

## 2021-11-01 PROCEDURE — 45385 COLONOSCOPY W/LESION REMOVAL: CPT | Performed by: INTERNAL MEDICINE

## 2021-11-01 RX ORDER — SODIUM CHLORIDE, SODIUM LACTATE, POTASSIUM CHLORIDE, CALCIUM CHLORIDE 600; 310; 30; 20 MG/100ML; MG/100ML; MG/100ML; MG/100ML
INJECTION, SOLUTION INTRAVENOUS CONTINUOUS
Status: DISCONTINUED | OUTPATIENT
Start: 2021-11-01 | End: 2021-11-01

## 2021-11-01 RX ORDER — NALOXONE HYDROCHLORIDE 0.4 MG/ML
80 INJECTION, SOLUTION INTRAMUSCULAR; INTRAVENOUS; SUBCUTANEOUS AS NEEDED
Status: DISCONTINUED | OUTPATIENT
Start: 2021-11-01 | End: 2021-11-01

## 2021-11-01 RX ADMIN — SODIUM CHLORIDE, SODIUM LACTATE, POTASSIUM CHLORIDE, CALCIUM CHLORIDE: 600; 310; 30; 20 INJECTION, SOLUTION INTRAVENOUS at 11:13:00

## 2021-11-01 RX ADMIN — SODIUM CHLORIDE, SODIUM LACTATE, POTASSIUM CHLORIDE, CALCIUM CHLORIDE: 600; 310; 30; 20 INJECTION, SOLUTION INTRAVENOUS at 11:51:00

## 2021-11-01 NOTE — ANESTHESIA PREPROCEDURE EVALUATION
Anesthesia PreOp Note    HPI:     Wilmer Espinoza is a 64year old female who presents for preoperative consultation requested by: Renay Hopkins MD    Date of Surgery: 11/1/2021    Procedure(s):  COLONOSCOPY  Indication: Colon cancer screeni History      Marital status:       Spouse name: Not on file      Number of children: Not on file      Years of education: Not on file      Highest education level: Not on file    Occupational History      Not on file    Tobacco Use      Smoking stat Status: Not on file  Intimate Partner Violence:       Fear of Current or Ex-Partner: Not on file      Emotionally Abused: Not on file      Physically Abused: Not on file      Sexually Abused: Not on file  Housing Stability:       Unable to Pay for Housing

## 2021-11-01 NOTE — ANESTHESIA POSTPROCEDURE EVALUATION
Patient: Gilberto Console    Procedure Summary     Date: 11/01/21 Room / Location: [address] [place] [address] ENDOSCOPY 04 / [address] [place] [address] ENDOSCOPY    Anesthesia Start: 8672 Anesthesia Stop: 1150    Procedure: COLONOSCOPY (N/A ) Diagnosis:       Colon cancer screening      (Polyps)

## 2021-11-01 NOTE — OPERATIVE REPORT
[facility] Endoscopy Report      Date of Procedure:  11/01/21      Preoperative Diagnosis:  Colorectal cancer screening      Postoperative Diagnosis:  Colon polyps      Procedure:    Colonoscopy with polypectomy      Surgeon:  Noemi Cope complication. Impression:  1. Colon polyps  2. Otherwise normal colonoscopy to the terminal ileum    Recommendations:  1. Standard post polypectomy instructions given. 2.  Follow-up biopsy results.   3.  Probable surveillance colonoscopy in 3 years

## 2021-11-01 NOTE — H&P
History & Physical Examination    Patient Name: Preet Fang  MRN: [id number]  CSN: [id number]  YOB: 1965    Diagnosis: Colorectal cancer screening      atorvastatin (LIPITOR) 20 MG Oral Tab, Take 1 tablet (20 mg total) by mouth nig above.    Suzanna Herrera MD  11/1/2021  11:09 AM

## 2021-11-03 ENCOUNTER — TELEPHONE (OUTPATIENT)
Dept: GASTROENTEROLOGY | Facility: CLINIC | Age: 56
End: 2021-11-03

## 2021-11-03 NOTE — TELEPHONE ENCOUNTER
Health maintenance updated. Last colonoscopy done 11/1/21. 3 year recall placed into [facility] Outreach, next due on 11/1/24 per Dr. Wilber Marte.

## 2021-11-03 NOTE — TELEPHONE ENCOUNTER
----- Message from Katerine Cortez MD sent at 11/2/2021  6:13 PM CDT -----  As per ASHLEY parameters I left a message with the patient's , Tanika Farfan. She had #2 adenomatous polyps removed.  Although the sigmoid polyp was histologically sized at 7 mm this

## 2022-03-16 ENCOUNTER — TELEPHONE (OUTPATIENT)
Dept: INTERNAL MEDICINE CLINIC | Facility: CLINIC | Age: 57
End: 2022-03-16

## 2022-03-16 NOTE — TELEPHONE ENCOUNTER
Note:  Dr Leela Brandt now has two openings on Monday (cancellations)  if needed when patient calls back

## 2022-03-16 NOTE — TELEPHONE ENCOUNTER
Noted.  Micheal Tong for patient to see me on Monday, March 21, as currently scheduled. If patient feels her pain is getting worse or she has more problems she can see one of my associates on Thursday or Friday if she wishes. In the meantime patient may apply warm compresses to the area of the neck where she has pain. Patient may also use Tylenol or ibuprofen as necessary for pain. I will forward this message to nursing.   Thank you!!

## 2022-03-16 NOTE — TELEPHONE ENCOUNTER
Unable to leave a message on Mobile # d/t mailbox being full. Home # rang twice then busy tone was heard.

## 2022-03-16 NOTE — TELEPHONE ENCOUNTER
Pt send mychart note to schedule appt for the following:    \"My left side of the neck is swollen and tender to touch. I would like it to be checked out. \"    No appointments available this week or Monday  Can pt be added to schedule?     Tasked to nursing

## 2022-03-16 NOTE — TELEPHONE ENCOUNTER
To Dr. LINDA to please advise----  Pt called back, reports L. side neck lymph node appears swollen and tender to touch. Denies sore throat, ear pain, cough, difficulty or pain when swallowing, fever, chills, body aches, nasal congestion. Next opening on 3/21 Monday, appt made for 1PM per pt request. To Dr. LINDA to please advise if OK to wait or if pt should reschedule with another [facility] physician to be seen sooner.      Please send patient a [employer] message with Dr. Tony Puri response per patient request.

## 2022-03-16 NOTE — TELEPHONE ENCOUNTER
I spoke with patient and relayed Dr. Merlyn Max message. She verbalized understanding. She will keep her appointment as scheduled. She denies any pain at this time. Invited her to call back with any questions or concerns.

## 2022-03-17 ENCOUNTER — LAB ENCOUNTER (OUTPATIENT)
Dept: LAB | Age: 57
End: 2022-03-17
Attending: INTERNAL MEDICINE
Payer: COMMERCIAL

## 2022-03-17 DIAGNOSIS — E78.00 HYPERCHOLESTEROLEMIA: ICD-10-CM

## 2022-03-17 DIAGNOSIS — E55.9 VITAMIN D DEFICIENCY: ICD-10-CM

## 2022-03-17 LAB
ALT SERPL-CCNC: 36 U/L
AST SERPL-CCNC: 20 U/L (ref 15–37)
CHOLEST SERPL-MCNC: 305 MG/DL (ref ?–200)
FASTING PATIENT LIPID ANSWER: YES
HDLC SERPL-MCNC: 112 MG/DL (ref 40–59)
LDLC SERPL CALC-MCNC: 169 MG/DL (ref ?–100)
NONHDLC SERPL-MCNC: 193 MG/DL (ref ?–130)
TRIGL SERPL-MCNC: 141 MG/DL (ref 30–149)
VIT D+METAB SERPL-MCNC: 20.7 NG/ML (ref 30–100)
VLDLC SERPL CALC-MCNC: 28 MG/DL (ref 0–30)

## 2022-03-17 PROCEDURE — 36415 COLL VENOUS BLD VENIPUNCTURE: CPT

## 2022-03-17 PROCEDURE — 84460 ALANINE AMINO (ALT) (SGPT): CPT

## 2022-03-17 PROCEDURE — 82306 VITAMIN D 25 HYDROXY: CPT

## 2022-03-17 PROCEDURE — 80061 LIPID PANEL: CPT

## 2022-03-17 PROCEDURE — 84450 TRANSFERASE (AST) (SGOT): CPT

## 2022-03-21 ENCOUNTER — OFFICE VISIT (OUTPATIENT)
Dept: INTERNAL MEDICINE CLINIC | Facility: CLINIC | Age: 57
End: 2022-03-21
Payer: COMMERCIAL

## 2022-03-21 ENCOUNTER — TELEPHONE (OUTPATIENT)
Dept: INTERNAL MEDICINE CLINIC | Facility: CLINIC | Age: 57
End: 2022-03-21

## 2022-03-21 VITALS
SYSTOLIC BLOOD PRESSURE: 130 MMHG | HEART RATE: 96 BPM | HEIGHT: 66 IN | TEMPERATURE: 98 F | OXYGEN SATURATION: 98 % | DIASTOLIC BLOOD PRESSURE: 80 MMHG | BODY MASS INDEX: 28.61 KG/M2 | WEIGHT: 178 LBS

## 2022-03-21 DIAGNOSIS — R22.1 LUMP IN NECK: Primary | ICD-10-CM

## 2022-03-21 DIAGNOSIS — E55.9 VITAMIN D DEFICIENCY: ICD-10-CM

## 2022-03-21 DIAGNOSIS — E78.00 HYPERCHOLESTEROLEMIA: ICD-10-CM

## 2022-03-21 PROCEDURE — 99214 OFFICE O/P EST MOD 30 MIN: CPT | Performed by: INTERNAL MEDICINE

## 2022-03-21 PROCEDURE — 3079F DIAST BP 80-89 MM HG: CPT | Performed by: INTERNAL MEDICINE

## 2022-03-21 PROCEDURE — 3075F SYST BP GE 130 - 139MM HG: CPT | Performed by: INTERNAL MEDICINE

## 2022-03-21 PROCEDURE — 3008F BODY MASS INDEX DOCD: CPT | Performed by: INTERNAL MEDICINE

## 2022-03-21 RX ORDER — ATORVASTATIN CALCIUM 20 MG/1
20 TABLET, FILM COATED ORAL NIGHTLY
Qty: 90 TABLET | Refills: 3 | Status: SHIPPED | OUTPATIENT
Start: 2022-03-21 | End: 2023-03-21

## 2022-03-21 NOTE — PATIENT INSTRUCTIONS
1.  Patient is to continue her current diet, medication and activity. 2.  I will refer the patient to see Dr. Abel Bradley for ENT evaluation of the left neck lump/mass. I am not sure whether this is part of the parotid gland or if it is a lymph node. 3.  I will refill the patient's Lipitor at 20 mg orally daily. 4.  Patient is to take vitamin D 2000 units orally daily. 5.  I will plan to see the patient back in about a month. 6.  Patient will need a repeat lipid panel, AST, ALT and vitamin D level in about 3 months.

## 2022-03-22 NOTE — TELEPHONE ENCOUNTER
Celine Colunga,  Mrs. Juan Luis Dorado was seen by me today for evaluation of a swelling/nodule/mass that she is noticed in her left upper anterior neck region. The area involved is just lateral to the left mandible. I am uncertain whether this is part of the parotid gland or is a lymph node. I have referred Mrs. Siva Knutson to see you for evaluation. Thank you for your assistance!     Dr Cathren Cowden

## 2022-04-05 ENCOUNTER — OFFICE VISIT (OUTPATIENT)
Dept: OTOLARYNGOLOGY | Facility: CLINIC | Age: 57
End: 2022-04-05
Payer: COMMERCIAL

## 2022-04-05 VITALS — HEIGHT: 66 IN | WEIGHT: 178 LBS | BODY MASS INDEX: 28.61 KG/M2

## 2022-04-05 DIAGNOSIS — R22.1 NECK MASS: Primary | ICD-10-CM

## 2022-04-05 PROCEDURE — 99213 OFFICE O/P EST LOW 20 MIN: CPT | Performed by: SPECIALIST

## 2022-04-05 PROCEDURE — 3008F BODY MASS INDEX DOCD: CPT | Performed by: SPECIALIST

## 2022-04-05 RX ORDER — AMOXICILLIN AND CLAVULANATE POTASSIUM 875; 125 MG/1; MG/1
1 TABLET, FILM COATED ORAL EVERY 12 HOURS
Qty: 20 TABLET | Refills: 0 | Status: SHIPPED | OUTPATIENT
Start: 2022-04-05

## 2022-04-05 NOTE — PATIENT INSTRUCTIONS
You have a left neck mass. 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  A CT scan was ordered to better evaluate this. I also placed on a trial of Augmentin.

## 2022-04-15 ENCOUNTER — HOSPITAL ENCOUNTER (OUTPATIENT)
Dept: CT IMAGING | Age: 57
Discharge: HOME | End: 2022-04-15
Attending: SPECIALIST
Payer: COMMERCIAL

## 2022-04-15 DIAGNOSIS — R22.1 NECK MASS: ICD-10-CM

## 2022-04-15 LAB — CREAT BLD-MCNC: 0.8 MG/DL

## 2022-04-15 PROCEDURE — 70491 CT SOFT TISSUE NECK W/DYE: CPT | Performed by: SPECIALIST

## 2022-04-15 PROCEDURE — 82565 ASSAY OF CREATININE: CPT

## 2022-04-23 ENCOUNTER — OFFICE VISIT (OUTPATIENT)
Dept: OTOLARYNGOLOGY | Facility: CLINIC | Age: 57
End: 2022-04-23
Payer: COMMERCIAL

## 2022-04-23 DIAGNOSIS — R22.1 NECK MASS: Primary | ICD-10-CM

## 2022-04-23 PROCEDURE — 99213 OFFICE O/P EST LOW 20 MIN: CPT | Performed by: SPECIALIST

## 2022-04-27 ENCOUNTER — PATIENT MESSAGE (OUTPATIENT)
Dept: OTOLARYNGOLOGY | Facility: CLINIC | Age: 57
End: 2022-04-27

## 2022-04-28 NOTE — TELEPHONE ENCOUNTER
From: Carl Tapia  To: Saulo Patel MD  Sent: 4/27/2022 7:41 PM CDT  Subject: My biopsy results. Celine Gautam. I am very happy that my resent neck biopsy results are negative. What would you recommend next for me to do ? Do you recommend to remove the cyst at this point? Or should I wait and watch it at this point. Thanks. Tian Canales.

## 2022-05-03 ENCOUNTER — TELEPHONE (OUTPATIENT)
Dept: OTOLARYNGOLOGY | Facility: CLINIC | Age: 57
End: 2022-05-03

## 2022-05-05 NOTE — TELEPHONE ENCOUNTER
Patient is scheduled for procedure on 5/16/22 with Dr. Cristiana Owens at [address] [place] [address].

## 2022-05-11 ENCOUNTER — APPOINTMENT (OUTPATIENT)
Dept: HEMATOLOGY/ONCOLOGY | Facility: HOSPITAL | Age: 57
End: 2022-05-11
Attending: INTERNAL MEDICINE
Payer: COMMERCIAL

## 2022-05-11 ENCOUNTER — TELEPHONE (OUTPATIENT)
Dept: OTOLARYNGOLOGY | Facility: CLINIC | Age: 57
End: 2022-05-11

## 2022-05-14 ENCOUNTER — LAB ENCOUNTER (OUTPATIENT)
Dept: LAB | Facility: HOSPITAL | Age: 57
End: 2022-05-14
Attending: SPECIALIST
Payer: COMMERCIAL

## 2022-05-14 DIAGNOSIS — Z01.818 PRE-OP TESTING: ICD-10-CM

## 2022-05-14 LAB — SARS-COV-2 RNA RESP QL NAA+PROBE: NOT DETECTED

## 2022-05-16 ENCOUNTER — PATIENT MESSAGE (OUTPATIENT)
Dept: OTOLARYNGOLOGY | Facility: CLINIC | Age: 57
End: 2022-05-16

## 2022-05-16 ENCOUNTER — HOSPITAL ENCOUNTER (OUTPATIENT)
Facility: HOSPITAL | Age: 57
Setting detail: HOSPITAL OUTPATIENT SURGERY
Discharge: HOME | End: 2022-05-16
Attending: SPECIALIST | Admitting: SPECIALIST
Payer: COMMERCIAL

## 2022-05-16 ENCOUNTER — ANESTHESIA EVENT (OUTPATIENT)
Dept: SURGERY | Facility: HOSPITAL | Age: 57
End: 2022-05-16
Payer: COMMERCIAL

## 2022-05-16 ENCOUNTER — ANESTHESIA (OUTPATIENT)
Dept: SURGERY | Facility: HOSPITAL | Age: 57
End: 2022-05-16
Payer: COMMERCIAL

## 2022-05-16 VITALS
WEIGHT: 180 LBS | RESPIRATION RATE: 18 BRPM | BODY MASS INDEX: 30.73 KG/M2 | HEIGHT: 64 IN | SYSTOLIC BLOOD PRESSURE: 144 MMHG | TEMPERATURE: 98 F | OXYGEN SATURATION: 97 % | HEART RATE: 70 BPM | DIASTOLIC BLOOD PRESSURE: 89 MMHG

## 2022-05-16 DIAGNOSIS — R59.9 ENLARGED LYMPH NODE: ICD-10-CM

## 2022-05-16 DIAGNOSIS — Z01.818 PRE-OP TESTING: Primary | ICD-10-CM

## 2022-05-16 LAB — B-HCG UR QL: NEGATIVE

## 2022-05-16 PROCEDURE — 07B20ZX EXCISION OF LEFT NECK LYMPHATIC, OPEN APPROACH, DIAGNOSTIC: ICD-10-PCS | Performed by: SPECIALIST

## 2022-05-16 PROCEDURE — 21555 EXC NECK LES SC < 3 CM: CPT | Performed by: SPECIALIST

## 2022-05-16 PROCEDURE — 0KB30ZZ EXCISION OF LEFT NECK MUSCLE, OPEN APPROACH: ICD-10-PCS | Performed by: SPECIALIST

## 2022-05-16 RX ORDER — LIDOCAINE HYDROCHLORIDE 10 MG/ML
INJECTION, SOLUTION EPIDURAL; INFILTRATION; INTRACAUDAL; PERINEURAL AS NEEDED
Status: DISCONTINUED | OUTPATIENT
Start: 2022-05-16 | End: 2022-05-16 | Stop reason: SURG

## 2022-05-16 RX ORDER — ACETAMINOPHEN 500 MG
1000 TABLET ORAL ONCE
Status: COMPLETED | OUTPATIENT
Start: 2022-05-16 | End: 2022-05-16

## 2022-05-16 RX ORDER — LIDOCAINE HYDROCHLORIDE 40 MG/ML
SOLUTION TOPICAL AS NEEDED
Status: DISCONTINUED | OUTPATIENT
Start: 2022-05-16 | End: 2022-05-16 | Stop reason: SURG

## 2022-05-16 RX ORDER — GLYCOPYRROLATE 0.2 MG/ML
INJECTION, SOLUTION INTRAMUSCULAR; INTRAVENOUS AS NEEDED
Status: DISCONTINUED | OUTPATIENT
Start: 2022-05-16 | End: 2022-05-16 | Stop reason: SURG

## 2022-05-16 RX ORDER — NALOXONE HYDROCHLORIDE 0.4 MG/ML
80 INJECTION, SOLUTION INTRAMUSCULAR; INTRAVENOUS; SUBCUTANEOUS AS NEEDED
Status: DISCONTINUED | OUTPATIENT
Start: 2022-05-16 | End: 2022-05-16

## 2022-05-16 RX ORDER — MORPHINE SULFATE 4 MG/ML
2 INJECTION, SOLUTION INTRAMUSCULAR; INTRAVENOUS EVERY 10 MIN PRN
Status: DISCONTINUED | OUTPATIENT
Start: 2022-05-16 | End: 2022-05-16

## 2022-05-16 RX ORDER — HYDROMORPHONE HYDROCHLORIDE 1 MG/ML
0.2 INJECTION, SOLUTION INTRAMUSCULAR; INTRAVENOUS; SUBCUTANEOUS EVERY 5 MIN PRN
Status: DISCONTINUED | OUTPATIENT
Start: 2022-05-16 | End: 2022-05-16

## 2022-05-16 RX ORDER — CEPHALEXIN 500 MG/1
500 CAPSULE ORAL EVERY 8 HOURS
Qty: 21 CAPSULE | Refills: 0 | Status: SHIPPED | OUTPATIENT
Start: 2022-05-16 | End: 2024-03-07 | Stop reason: ALTCHOICE

## 2022-05-16 RX ORDER — HYDROCODONE BITARTRATE AND ACETAMINOPHEN 7.5; 325 MG/1; MG/1
1-2 TABLET ORAL EVERY 4 HOURS PRN
Qty: 20 TABLET | Refills: 0 | Status: SHIPPED | OUTPATIENT
Start: 2022-05-16 | End: 2024-03-07 | Stop reason: ALTCHOICE

## 2022-05-16 RX ORDER — SODIUM CHLORIDE, SODIUM LACTATE, POTASSIUM CHLORIDE, CALCIUM CHLORIDE 600; 310; 30; 20 MG/100ML; MG/100ML; MG/100ML; MG/100ML
INJECTION, SOLUTION INTRAVENOUS CONTINUOUS
Status: DISCONTINUED | OUTPATIENT
Start: 2022-05-16 | End: 2022-05-16

## 2022-05-16 RX ORDER — METOCLOPRAMIDE 10 MG/1
10 TABLET ORAL ONCE
Status: COMPLETED | OUTPATIENT
Start: 2022-05-16 | End: 2022-05-16

## 2022-05-16 RX ORDER — HYDROMORPHONE HYDROCHLORIDE 1 MG/ML
0.6 INJECTION, SOLUTION INTRAMUSCULAR; INTRAVENOUS; SUBCUTANEOUS EVERY 5 MIN PRN
Status: DISCONTINUED | OUTPATIENT
Start: 2022-05-16 | End: 2022-05-16

## 2022-05-16 RX ORDER — MORPHINE SULFATE 4 MG/ML
4 INJECTION, SOLUTION INTRAMUSCULAR; INTRAVENOUS EVERY 10 MIN PRN
Status: DISCONTINUED | OUTPATIENT
Start: 2022-05-16 | End: 2022-05-16

## 2022-05-16 RX ORDER — MORPHINE SULFATE 10 MG/ML
6 INJECTION, SOLUTION INTRAMUSCULAR; INTRAVENOUS EVERY 10 MIN PRN
Status: DISCONTINUED | OUTPATIENT
Start: 2022-05-16 | End: 2022-05-16

## 2022-05-16 RX ORDER — ONDANSETRON 2 MG/ML
INJECTION INTRAMUSCULAR; INTRAVENOUS AS NEEDED
Status: DISCONTINUED | OUTPATIENT
Start: 2022-05-16 | End: 2022-05-16 | Stop reason: SURG

## 2022-05-16 RX ORDER — ROCURONIUM BROMIDE 10 MG/ML
INJECTION, SOLUTION INTRAVENOUS AS NEEDED
Status: DISCONTINUED | OUTPATIENT
Start: 2022-05-16 | End: 2022-05-16 | Stop reason: SURG

## 2022-05-16 RX ORDER — LIDOCAINE HYDROCHLORIDE AND EPINEPHRINE 10; 10 MG/ML; UG/ML
INJECTION, SOLUTION INFILTRATION; PERINEURAL AS NEEDED
Status: DISCONTINUED | OUTPATIENT
Start: 2022-05-16 | End: 2022-05-16 | Stop reason: HOSPADM

## 2022-05-16 RX ORDER — DEXAMETHASONE SODIUM PHOSPHATE 4 MG/ML
VIAL (ML) INJECTION AS NEEDED
Status: DISCONTINUED | OUTPATIENT
Start: 2022-05-16 | End: 2022-05-16 | Stop reason: SURG

## 2022-05-16 RX ORDER — FAMOTIDINE 20 MG/1
20 TABLET, FILM COATED ORAL ONCE
Status: COMPLETED | OUTPATIENT
Start: 2022-05-16 | End: 2022-05-16

## 2022-05-16 RX ORDER — HYDROMORPHONE HYDROCHLORIDE 1 MG/ML
0.4 INJECTION, SOLUTION INTRAMUSCULAR; INTRAVENOUS; SUBCUTANEOUS EVERY 5 MIN PRN
Status: DISCONTINUED | OUTPATIENT
Start: 2022-05-16 | End: 2022-05-16

## 2022-05-16 RX ADMIN — GLYCOPYRROLATE 0.2 MG: 0.2 INJECTION, SOLUTION INTRAMUSCULAR; INTRAVENOUS at 11:17:00

## 2022-05-16 RX ADMIN — ROCURONIUM BROMIDE 10 MG: 10 INJECTION, SOLUTION INTRAVENOUS at 11:17:00

## 2022-05-16 RX ADMIN — ONDANSETRON 4 MG: 2 INJECTION INTRAMUSCULAR; INTRAVENOUS at 11:17:00

## 2022-05-16 RX ADMIN — LIDOCAINE HYDROCHLORIDE 50 MG: 10 INJECTION, SOLUTION EPIDURAL; INFILTRATION; INTRACAUDAL; PERINEURAL at 11:17:00

## 2022-05-16 RX ADMIN — SODIUM CHLORIDE, SODIUM LACTATE, POTASSIUM CHLORIDE, CALCIUM CHLORIDE: 600; 310; 30; 20 INJECTION, SOLUTION INTRAVENOUS at 11:17:00

## 2022-05-16 RX ADMIN — LIDOCAINE HYDROCHLORIDE 4 ML: 40 SOLUTION TOPICAL at 11:17:00

## 2022-05-16 RX ADMIN — DEXAMETHASONE SODIUM PHOSPHATE 4 MG: 4 MG/ML VIAL (ML) INJECTION at 11:17:00

## 2022-05-16 RX ADMIN — SODIUM CHLORIDE, SODIUM LACTATE, POTASSIUM CHLORIDE, CALCIUM CHLORIDE: 600; 310; 30; 20 INJECTION, SOLUTION INTRAVENOUS at 12:24:00

## 2022-05-16 NOTE — ANESTHESIA PROCEDURE NOTES
Airway  Date/Time: 5/16/2022 11:18 AM  Urgency: Elective    Airway not difficult    General Information and Staff    Patient location during procedure: OR  Anesthesiologist: Levy Gomez MD  Performed: anesthesiologist     Indications and Patient Condition  Indications for airway management: anesthesia  Sedation level: deep  Preoxygenated: yes  Patient position: sniffing  Mask difficulty assessment: 1 - vent by mask    Final Airway Details  Final airway type: endotracheal airway      Successful airway: ETT  Cuffed: yes   Successful intubation technique: direct laryngoscopy  Endotracheal tube insertion site: oral  Blade: Norma  Blade size: #4  ETT size (mm): 7.0    Cormack-Lehane Classification: grade I - full view of glottis  Placement verified by: chest auscultation and capnometry   Cuff volume (mL): 7  Measured from: lips  ETT to lips (cm): 21  Number of attempts at approach: 1

## 2022-05-16 NOTE — BRIEF OP NOTE
Continue to hold her linzess and amitza.  Can take imiodium to help with diarrhea as needed.  Let me know if still persistent on Monday.    Pre-Operative Diagnosis:Left neck mass and  Enlarged lymph node [R59.9]     Post-Operative Diagnosis:Left neck mass and  Enlarged lymph node [R59.9]      Procedure Performed:   Excision of left neck mass and   EXCISION OF LEFT DEEP JUGULAR LYMPH NODE    Surgeon(s) and Role:     * Fernandez Bermeo MD - Primary     * Denita Apley, MD - Assisting Surgeon    Assistant(s):        Surgical Findings: larger oval neck mass overlying the jugular vein with adherent smaller inferior neck node     Specimen: neck mass with attached inferior neck node     Estimated Blood Loss: Blood Output: 5 mL (5/16/2022 12:24 PM)      Dictation Number:  [id number]    Rambo Beaulieu MD  5/16/2022  12:35 PM

## 2022-05-16 NOTE — DISCHARGE INSTRUCTIONS
· Okay to remove dressing in 48 hours.  · Keep incision dry and clean.  · Apply triple antibiotic ointment to the incision twice daily until sutures removed.  · Tylenol or Norco for pain.  · No aspirin Advil Aleve ibuprofen or Motrin.  · Call with any problems.  · Can put an ice pack to the neck.  · Prescriptions Norco and Keflex.  · Diet as tolerated.  · No heavy lifting or straining.     HOME INSTRUCTIONS  [facility] HOME CARE INSTRUCTIONS: POST-OP ANESTHESIA  The medication that you received for sedation or general anesthesia can last up to 24 hours. Your judgment and reflexes may be altered, even if you feel like your normal self.      We Recommend:   · Do not drive any motor vehicle or bicycle   · Avoid mowing the lawn, playing sports, or working with power tools/applicances (power saws, electric knives or mixers)   · That you have someone stay with you on your first night home   · Do not drink alcohol or take sleeping pills or tranquilizers   · Do not sign legal documents within 24 hours of your procedure   · If you had a nerve block for your surgery, take extra care not to put any pressure on your arm or hand for 24 hours    It is normal:  · For you to have a sore throat if you had a breathing tube during surgery (while you were asleep!). The sore throat should get better within 48 hours. You can gargle with warm salt water (1/2 tsp in 4 oz warm water) or use a throat lozenge for comfort  · To feel muscle aches or soreness especially in the abdomen, chest or neck. The achy feeling should go away in the next 24 hours  · To feel weak, sleepy or \"wiped out\". Your should start feeling better in the next 24 hours.   · To experience mild discomforts such as sore lip or tongue, headache, cramps, gas pains or a bloated feeling in your abdomen.   · To experience mild back pain or soreness for a day or two if you had spinal or epidural anesthesia.   · If you had laparoscopic surgery, to feel shoulder pain or discomfort on  the day of surgery.   · For some patients to have nausea after surgery/anesthesia    If you feel nausea or experience vomiting:   · Try to move around less.   · Eat less than usual or drink only liquids until the next morning   · Nausea should resolve in about 24 hours    If you have a problem when you are at home:    · Call your surgeons office

## 2022-05-16 NOTE — INTERVAL H&P NOTE
Pre-op Diagnosis: Enlarged lymph node [R59.9]    The above referenced H&P was reviewed by Marcelo Jj MD on 5/16/2022, the patient was examined and no significant changes have occurred in the patient's condition since the H&P was performed. I discussed with the patient and/or legal representative the potential benefits, risks and side effects of this procedure; the likelihood of the patient achieving goals; and potential problems that might occur during recuperation. I discussed reasonable alternatives to the procedure, including risks, benefits and side effects related to the alternatives and risks related to not receiving this procedure. We will proceed with procedure as planned.   Mass feels smaller

## 2022-05-17 ENCOUNTER — TELEPHONE (OUTPATIENT)
Dept: OTOLARYNGOLOGY | Facility: CLINIC | Age: 57
End: 2022-05-17

## 2022-05-17 NOTE — OPERATIVE REPORT
[facility]    PATIENT'S NAME: Arlet Silverio   ATTENDING PHYSICIAN: Latricia Espinoza MD   OPERATING PHYSICIAN: Latricia Espinoza MD   PATIENT ACCOUNT#:   [de-identified]    LOCATION:  [facility] 3 [facility] 10  MEDICAL RECORD #:   [id number]       YOB: 1965  ADMISSION DATE:       05/16/2022      OPERATION DATE:  05/16/2022    OPERATIVE REPORT      She is a patient of the ENT Service and Dr. Nacho Joy. PREOPERATIVE DIAGNOSIS:  Left neck mass with fine-needle aspirate, most consistent with a lymph node. POSTOPERATIVE DIAGNOSIS:  Left neck mass and enlarged attached lymph node inferior to it. PROCEDURE:  Excision of left neck mass and attached deep jugular lymph node. Zulema Chen MD    ESTIMATED BLOOD LOSS:  5 mL. ANESTHESIA:  General by oral endotracheal tube. COMPLICATIONS:  None. SPECIMENS:  Neck mass and attached node sent fresh for lymphoma workup. INDICATIONS:  This is a 80-year-old female who had a left neck mass. An antibiotic was given. However, the mass did not resolve. A CT scan was done which showed a neck mass which was felt to be either a cyst or a necrotic lymph node. A fine-needle aspirate showed only small lymphocytes which looked benign, however, it was not a mixed cell population. For the above-mentioned reason, the procedure was indicated. OPERATIVE TECHNIQUE:  Patient was taken the operating room suite, placed under general anesthesia, and an oral endotracheal tube was placed. She was given 2 g preoperative Ancef. She was sterilely prepped and draped in usual fashion. An incision was made 2 fingerbreadths below the mandible. This was done with a #15 blade. The subcutaneous fat and platysma was then incised. The mass was just posterior to the submandibular gland which was identified and pushed anteriorly. The facial vein was identified and pushed anteriorly.   A circumferential dissection of the mass was done starting laterally and then inferiorly, posteriorly, and then anteriorly. Lastly, the mass was mobilized along with the lymph node and pulled superiorly, and then the inferior aspect of the mass was dissected. After this was done, positive pressure was given and Avitene was placed in the base of the wound. There was no further bleeding noted. Much of the dissection was done with a Jennifer clamp and bipolar cautery. Great care was taken to avoid any injury to the marginal mandibular nerve and spinal accessory nerve which were identified and preserved. The carotid artery and jugular veins were deep to the dissection. The closure was in 3 layers with 4-0 chromic of platysma and subcutaneous, followed by an interrupted 5-0 nylon. Bacitracin ointment and a sterile dressing were placed. The patient tolerated the procedure well, was extubated in the operating suite, and taken to recovery room in good condition.   Her spinal accessory and lip movement was normal.    Dictated By Marlena Bennett MD  d: 05/16/2022 12:40:38  t: 05/16/2022 19:17:09  [facility] [id number]  [facility]/

## 2022-05-17 NOTE — TELEPHONE ENCOUNTER
POD 1 excision of lymph node. Patient reports doing well. No pain, taking antibiotic as prescribed. Incision site looks good and will notify us with any signs of infection.    FU scheduled 5/23/22

## 2022-05-17 NOTE — TELEPHONE ENCOUNTER
From: Shaina Humphrey  Sent: 5/17/2022 6:52 AM CDT  To: Em Ent Clinical Staff  Subject: My biopsy results. Celine Siegelast. Please disregard my resent message to you.l just found and scheduled follow up apt with you no Tuesday may 23 at [facility] location at 8:50 am.I hope this is fine with you since you have no available apt on Monday May 22. Thank you.    Carlos Hu

## 2022-05-18 ENCOUNTER — TELEPHONE (OUTPATIENT)
Dept: HEMATOLOGY/ONCOLOGY | Facility: HOSPITAL | Age: 57
End: 2022-05-18

## 2022-05-18 NOTE — TELEPHONE ENCOUNTER
Pt cx appt today via my chart because \"Just got my surgical exam report and it is negative for cancer\" Sharron Quesadaite

## 2022-05-23 ENCOUNTER — OFFICE VISIT (OUTPATIENT)
Dept: OTOLARYNGOLOGY | Facility: CLINIC | Age: 57
End: 2022-05-23
Payer: COMMERCIAL

## 2022-05-23 VITALS — WEIGHT: 180 LBS | HEIGHT: 64 IN | BODY MASS INDEX: 30.73 KG/M2

## 2022-05-23 DIAGNOSIS — Q18.0 BRANCHIAL CLEFT CYST: Primary | ICD-10-CM

## 2022-05-23 PROCEDURE — 3008F BODY MASS INDEX DOCD: CPT | Performed by: SPECIALIST

## 2022-05-23 PROCEDURE — 99024 POSTOP FOLLOW-UP VISIT: CPT | Performed by: SPECIALIST

## 2022-05-23 NOTE — PROGRESS NOTES
Postop day #7    No complaints by the patient. Physical examination:  Neck incision flat and dry. No erythema. Sutures removed. Triple antibiotic ointment placed. Pathology reviewed with patient. Impression: Branchial cleft cyst.  Plan: Okay to return to work. Continue to apply ointment over the incision. Can return to swimming starting next week. Call or follow-up with any additional questions or problems.

## 2022-05-23 NOTE — PATIENT INSTRUCTIONS
Sutures were removed from your neck. These continue to apply an ointment over the incision. Okay to return swimming starting next week. Call or follow-up with any questions or problems.   Pathology was significant for branchial cleft cyst.

## 2022-11-02 ENCOUNTER — IMMUNIZATION (OUTPATIENT)
Dept: LAB | Facility: HOSPITAL | Age: 57
End: 2022-11-02
Attending: PREVENTIVE MEDICINE
Payer: COMMERCIAL

## 2022-11-02 DIAGNOSIS — Z23 NEED FOR VACCINATION: Primary | ICD-10-CM

## 2022-11-02 PROCEDURE — 90471 IMMUNIZATION ADMIN: CPT

## 2023-07-06 ENCOUNTER — TELEPHONE (OUTPATIENT)
Dept: INTERNAL MEDICINE CLINIC | Facility: CLINIC | Age: 58
End: 2023-07-06

## 2023-07-06 RX ORDER — ATORVASTATIN CALCIUM 20 MG/1
TABLET, FILM COATED ORAL
Qty: 15 TABLET | Refills: 0 | Status: SHIPPED | OUTPATIENT
Start: 2023-07-06

## 2023-07-06 NOTE — TELEPHONE ENCOUNTER
To Flash FENTON out this week); pls review Rx request    Rx failed protocol. Pt is overdue for Px and updated lab work. Last Px 7/2021. Px scheduled for 7/20/23. She cancelled last 2 appointments. Pt should have run out of medication 3/3023.

## 2024-02-22 ENCOUNTER — TELEPHONE (OUTPATIENT)
Dept: INTERNAL MEDICINE CLINIC | Facility: CLINIC | Age: 59
End: 2024-02-22

## 2024-02-22 DIAGNOSIS — Z00.00 ANNUAL PHYSICAL EXAM: ICD-10-CM

## 2024-02-22 DIAGNOSIS — E55.9 VITAMIN D DEFICIENCY: Primary | ICD-10-CM

## 2024-02-22 DIAGNOSIS — R53.83 FATIGUE, UNSPECIFIED TYPE: ICD-10-CM

## 2024-02-22 DIAGNOSIS — E78.00 HYPERCHOLESTEROLEMIA: ICD-10-CM

## 2024-02-22 NOTE — TELEPHONE ENCOUNTER
Patient calling to request blood work prior to:    [x] physical    [] check-up      [] other    Patient uses:  [x] [facility]       [facility] location:       Fax #:    Patient prefers to be notified once labs are placed by: [x] phone call  [] my chart message     Tasked to nursing

## 2024-02-23 NOTE — TELEPHONE ENCOUNTER
.  Please notify patient that I have approved the orders that were placed and they have now been approved and are available in the system to be done prior to her office visit with me as scheduled.  I will forward this message to nursing.  Thank you!!

## 2024-03-05 ENCOUNTER — LAB ENCOUNTER (OUTPATIENT)
Dept: LAB | Age: 59
End: 2024-03-05
Attending: INTERNAL MEDICINE
Payer: COMMERCIAL

## 2024-03-05 DIAGNOSIS — Z00.00 ANNUAL PHYSICAL EXAM: ICD-10-CM

## 2024-03-05 DIAGNOSIS — E55.9 VITAMIN D DEFICIENCY: ICD-10-CM

## 2024-03-05 DIAGNOSIS — R53.83 FATIGUE, UNSPECIFIED TYPE: ICD-10-CM

## 2024-03-05 DIAGNOSIS — E78.00 HYPERCHOLESTEROLEMIA: ICD-10-CM

## 2024-03-05 LAB
ALBUMIN SERPL-MCNC: 4.6 G/DL (ref 3.2–4.8)
ALBUMIN/GLOB SERPL: 1.5 {RATIO} (ref 1–2)
ALP LIVER SERPL-CCNC: 75 U/L
ALT SERPL-CCNC: 22 U/L
ANION GAP SERPL CALC-SCNC: 5 MMOL/L (ref 0–18)
AST SERPL-CCNC: 21 U/L (ref ?–34)
BASOPHILS # BLD AUTO: 0.05 X10(3) UL (ref 0–0.2)
BASOPHILS NFR BLD AUTO: 1 %
BILIRUB SERPL-MCNC: 0.6 MG/DL (ref 0.3–1.2)
BILIRUB UR QL: NEGATIVE
BUN BLD-MCNC: 16 MG/DL (ref 9–23)
BUN/CREAT SERPL: 18.8 (ref 10–20)
CALCIUM BLD-MCNC: 9.6 MG/DL (ref 8.7–10.4)
CHLORIDE SERPL-SCNC: 106 MMOL/L (ref 98–112)
CHOLEST SERPL-MCNC: 295 MG/DL (ref ?–200)
CLARITY UR: CLEAR
CO2 SERPL-SCNC: 28 MMOL/L (ref 21–32)
CREAT BLD-MCNC: 0.85 MG/DL
DEPRECATED RDW RBC AUTO: 44 FL (ref 35.1–46.3)
EGFRCR SERPLBLD CKD-EPI 2021: 79 ML/MIN/1.73M2 (ref 60–?)
EOSINOPHIL # BLD AUTO: 0.07 X10(3) UL (ref 0–0.7)
EOSINOPHIL NFR BLD AUTO: 1.4 %
ERYTHROCYTE [DISTWIDTH] IN BLOOD BY AUTOMATED COUNT: 13.2 % (ref 11–15)
FASTING PATIENT LIPID ANSWER: YES
FASTING STATUS PATIENT QL REPORTED: YES
GLOBULIN PLAS-MCNC: 3.1 G/DL (ref 2.8–4.4)
GLUCOSE BLD-MCNC: 92 MG/DL (ref 70–99)
GLUCOSE UR-MCNC: NORMAL MG/DL
HCT VFR BLD AUTO: 42.8 %
HDLC SERPL-MCNC: 107 MG/DL (ref 40–59)
HGB BLD-MCNC: 14.3 G/DL
HGB UR QL STRIP.AUTO: NEGATIVE
IMM GRANULOCYTES # BLD AUTO: 0.01 X10(3) UL (ref 0–1)
IMM GRANULOCYTES NFR BLD: 0.2 %
KETONES UR-MCNC: NEGATIVE MG/DL
LDLC SERPL CALC-MCNC: 168 MG/DL (ref ?–100)
LEUKOCYTE ESTERASE UR QL STRIP.AUTO: 75
LYMPHOCYTES # BLD AUTO: 2.01 X10(3) UL (ref 1–4)
LYMPHOCYTES NFR BLD AUTO: 39.4 %
MCH RBC QN AUTO: 30.2 PG (ref 26–34)
MCHC RBC AUTO-ENTMCNC: 33.4 G/DL (ref 31–37)
MCV RBC AUTO: 90.5 FL
MONOCYTES # BLD AUTO: 0.41 X10(3) UL (ref 0.1–1)
MONOCYTES NFR BLD AUTO: 8 %
NEUTROPHILS # BLD AUTO: 2.55 X10 (3) UL (ref 1.5–7.7)
NEUTROPHILS # BLD AUTO: 2.55 X10(3) UL (ref 1.5–7.7)
NEUTROPHILS NFR BLD AUTO: 50 %
NITRITE UR QL STRIP.AUTO: NEGATIVE
NONHDLC SERPL-MCNC: 188 MG/DL (ref ?–130)
OSMOLALITY SERPL CALC.SUM OF ELEC: 289 MOSM/KG (ref 275–295)
PH UR: 6.5 [PH] (ref 5–8)
PLATELET # BLD AUTO: 283 10(3)UL (ref 150–450)
POTASSIUM SERPL-SCNC: 4.4 MMOL/L (ref 3.5–5.1)
PROT SERPL-MCNC: 7.7 G/DL (ref 5.7–8.2)
PROT UR-MCNC: NEGATIVE MG/DL
RBC # BLD AUTO: 4.73 X10(6)UL
SODIUM SERPL-SCNC: 139 MMOL/L (ref 136–145)
SP GR UR STRIP: 1.02 (ref 1–1.03)
TRIGL SERPL-MCNC: 118 MG/DL (ref 30–149)
TSI SER-ACNC: 2.12 MIU/ML (ref 0.55–4.78)
UROBILINOGEN UR STRIP-ACNC: NORMAL
VIT D+METAB SERPL-MCNC: 12.7 NG/ML (ref 30–100)
VLDLC SERPL CALC-MCNC: 23 MG/DL (ref 0–30)
WBC # BLD AUTO: 5.1 X10(3) UL (ref 4–11)

## 2024-03-05 PROCEDURE — 84443 ASSAY THYROID STIM HORMONE: CPT

## 2024-03-05 PROCEDURE — 36415 COLL VENOUS BLD VENIPUNCTURE: CPT

## 2024-03-05 PROCEDURE — 82306 VITAMIN D 25 HYDROXY: CPT

## 2024-03-05 PROCEDURE — 85025 COMPLETE CBC W/AUTO DIFF WBC: CPT

## 2024-03-05 PROCEDURE — 87086 URINE CULTURE/COLONY COUNT: CPT

## 2024-03-05 PROCEDURE — 81001 URINALYSIS AUTO W/SCOPE: CPT

## 2024-03-05 PROCEDURE — 80061 LIPID PANEL: CPT

## 2024-03-05 PROCEDURE — 80053 COMPREHEN METABOLIC PANEL: CPT

## 2024-03-07 ENCOUNTER — OFFICE VISIT (OUTPATIENT)
Dept: INTERNAL MEDICINE CLINIC | Facility: CLINIC | Age: 59
End: 2024-03-07
Payer: COMMERCIAL

## 2024-03-07 VITALS
HEART RATE: 80 BPM | HEIGHT: 64 IN | DIASTOLIC BLOOD PRESSURE: 90 MMHG | SYSTOLIC BLOOD PRESSURE: 140 MMHG | OXYGEN SATURATION: 96 % | TEMPERATURE: 99 F | WEIGHT: 178 LBS | BODY MASS INDEX: 30.39 KG/M2

## 2024-03-07 DIAGNOSIS — E78.00 HYPERCHOLESTEROLEMIA: ICD-10-CM

## 2024-03-07 DIAGNOSIS — E55.9 VITAMIN D DEFICIENCY: ICD-10-CM

## 2024-03-07 DIAGNOSIS — Z00.00 ANNUAL PHYSICAL EXAM: Primary | ICD-10-CM

## 2024-03-07 DIAGNOSIS — R53.83 FATIGUE, UNSPECIFIED TYPE: ICD-10-CM

## 2024-03-07 DIAGNOSIS — Z12.31 SCREENING MAMMOGRAM FOR BREAST CANCER: ICD-10-CM

## 2024-03-07 LAB
ATRIAL RATE: 81 BPM
P AXIS: 10 DEGREES
P-R INTERVAL: 156 MS
Q-T INTERVAL: 390 MS
QRS DURATION: 96 MS
QTC CALCULATION (BEZET): 453 MS
R AXIS: -23 DEGREES
T AXIS: 11 DEGREES
VENTRICULAR RATE: 81 BPM

## 2024-03-07 PROCEDURE — 93000 ELECTROCARDIOGRAM COMPLETE: CPT | Performed by: INTERNAL MEDICINE

## 2024-03-07 PROCEDURE — 99396 PREV VISIT EST AGE 40-64: CPT | Performed by: INTERNAL MEDICINE

## 2024-03-07 RX ORDER — ATORVASTATIN CALCIUM 20 MG/1
20 TABLET, FILM COATED ORAL NIGHTLY
Qty: 90 TABLET | Refills: 3 | Status: SHIPPED | OUTPATIENT
Start: 2024-03-07 | End: 2025-03-07

## 2024-03-07 NOTE — PATIENT INSTRUCTIONS
Patient is to continue her current diet, medication and activity.  Patient is to resume taking Lipitor/atorvastatin 20 mg orally every evening.  Patient is resumed taking vitamin D 2000's orally daily.  She can get this medication over-the-counter.  I will see the patient back in 2 months with blood test that will include a lipid panel, AST, ALT and vitamin D level.  I will see the patient back sooner as necessary.

## 2024-03-07 NOTE — PROGRESS NOTES
HPI   Zandra Amaya is a 58 year old female who was seen by me on 2024 for her annual physical examination.  Patient has not been seen for the past 3 years.  Patient notes that she has had to cancel her appointment with me on 3 occasions due to family issues.  Patient is from [place] and her parents had remained in [place].  Last year patient's father had a fall and ended up in a hospital in [place].  Patient went to [facility] to assist him and was there for 6 weeks.  His father did poorly in the hospital and returned home where he was essentially bedridden.  Previously had been a healthy individual.  She was told that he had kidney failure and he would be periodically hospitalized to have his system \"flushed out\".  Patient returned home after 6 weeks in [place].  Patient's father then  3 months later at the age of 86.  Patient's mother then  3 months after that at the age of 85 after having a massive stroke.  Patient had to spend some time in [place] helping with the legal affairs following both of her parents test.  Patient is now back in the [facility].  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  Patient has been assigned to the [employer] where she works 3 days a week but works 12-hour days.  Patient currently feels well.  She has been under much stress with her family illnesses and legal affairs.  Patient feels that just recently she started to get back to \"normal\".  Patient knows that she has a high cholesterol reading but has stopped taking her atorvastatin when she ran out over a year ago and also stopped taking her vitamin D supplementation.  Patient is starting to watch her diet and exercise on a regular basis.  She has no complaints of chest pain or shortness of breath.  The patient notes that when she works out she workout on elliptical for 15 minutes.  Patient presents today for follow-up evaluation.  Patient has requested an  order for a screening mammogram.    Wt Readings from Last 6 Encounters:   24 178 lb (80.7 kg)   22 180 lb (81.6 kg)   22 180 lb (81.6 kg)   22 178 lb (80.7 kg)   22 178 lb (80.7 kg)   21 170 lb (77.1 kg)     Body mass index is 30.55 kg/m².       Current Outpatient Medications   Medication Sig Dispense Refill    atorvastatin (LIPITOR) 20 MG Oral Tab Take 1 tablet (20 mg total) by mouth nightly. 90 tablet 3    Cholecalciferol (VITAMIN D) 1000 units Oral Tab 1000 units   Sig-2000 unitsorally every morning. 100 tablet 11      Past Medical History:   Diagnosis Date    Essential hypertension 3/10/2021    /100    High blood pressure     no meds    High cholesterol     Hyperlipidemia 3/10/2021    Bad cholesterol is 300    Menometrorrhagia 2010    Obesity     Visual impairment       Past Surgical History:   Procedure Laterality Date    APPENDECTOMY      APPENDECTOMY  5/10/1975    BIOPSY/REMOVAL, LYMPH NODE(S)  2022    COLONOSCOPY N/A 2021    Procedure: COLONOSCOPY;  Surgeon: Natanael Bermeo MD;  Location: [facility] ENDOSCOPY    ENDOMETRIAL ABLATION      Novasure      1988    TUBAL LIGATION        Family History   Problem Relation Age of Onset    Asthma Mother       Social History:   Social History     Socioeconomic History    Marital status:    Tobacco Use    Smoking status: Never    Smokeless tobacco: Never   Vaping Use    Vaping Use: Never used   Substance and Sexual Activity    Alcohol use: Yes     Alcohol/week: 3.0 standard drinks of alcohol     Types: 3 Glasses of wine per week     Comment: occasional    Drug use: No    Sexual activity: Yes     Partners: Male   Other Topics Concern    Caffeine Concern Yes     Comment: coffee-2 cups/day          REVIEW OF SYSTEMS:   GENERAL: feels well   SKIN: denies any unusual skin lesions  EYES:denies blurred vision or double vision  HEENT: denies nasal congestion, sinus pain or ST  LUNGS: denies shortness of  breath or cough  CARDIOVASCULAR: denies chest pain, pressure, or palpitations  GI: denies abdominal pain, nausea, vomiting, diarrhea, constipation, hematochezia, or melena  :No urinary complaints  NEURO: denies headaches or dizziness    EXAM:   /90 (BP Location: Left arm, Patient Position: Sitting, Cuff Size: large)   Pulse 80   Temp 98.6 °F (37 °C)   Ht 5' 4\" (1.626 m)   Wt 178 lb (80.7 kg)   SpO2 96%   BMI 30.55 kg/m²     GENERAL: well developed, well nourished,in no apparent distress  SKIN: no rashes,no suspicious lesions  HEENT: atraumatic, normocephalic, normal oropharynx, normal TM's  EYES:PERRLA, EOMI,conjunctiva are clear, sclerae are nonicteric  NECK: supple, no cervical or supraclavicular LAD, no carotid bruits, no JVD  CHEST: no chest tenderness  BREAST: no dominant or suspicious mass, no axillary LAD.  Patient's breasts appear normal.  No breast masses are noted.  LUNGS: clear to auscultation  CARDIO: RRR, normal S1S2, no murmurs  GI: Protuberant, BS are present, no masses or organomegaly  MUSCULOSKELETAL: back is not tender, no pain or swelling in her legs  EXTREMITIES: no edema, all pulses are intact  NEURO; Alert and oriented, CN are intact, DTRs are 1+ bilaterally with absent Achilles reflexes bilaterally.    Patient's EKG had an NSR of 81 bpm.  Its axis was a -23 degree angle.  EKG is normal.    Patient CBC is normal.  Patient's CMP is normal.  Patient's urinalysis was normal except that did show some leukocyte esterase.  However microscopically the urine had 1-5 WBCs and 0-2 RBCs which is normal.  A urine culture revealed there to be no growth.  Patient's lipid panel had a cholesterol of 295, triglycerides 118, HDL cholesterol is 107 and LDL cholesterol was 168.  Patient's TSH was normal at 2.115.  Patient's vitamin D level was low at 12.7.      ASSESSMENT AND PLAN:   1. Annual physical exam  Patient appears to be doing well at this time.  Patient has been under much stress over the  past few years with illnesses and deaths of both of her parents in [place] in their mid 80s.  Patient much stress with her illnesses and also with dealing with the healthcare system in [place].  Patient is now trying to follow a better diet and is also trying to exercise on a daily basis.  I have sent in a prescription for patient agrees to resume taking atorvastatin 20 mg orally nightly.  Patient is also to take vitamin D 2000 units orally daily.  Patient also requested a mammogram and an order for a 2D screening mammogram has been placed in the system.  I will plan to see the patient back in 2 months with a lipid panel, AST, ALT and a vitamin D level.  I will see the patient back sooner as necessary.    - ELECTROCARDIOGRAM, COMPLETE    2. Hypercholesterolemia  Patient's recent lipid panel has shown her cholesterol to be 295, triglycerides 218, HDL cholesterol was 107 and LDL cholesterol was 168.  Patient's AST was 21 and ALT was 22.  Patient has been starting to watch her diet more closely again.  She is also started to workout on a daily basis.  I have sent the prescription for Lipitor for the patient to take 20 mg orally daily at bedtime.  I will see the patient back in 2 months with a lipid panel, AST, ALT and vitamin D level.  I will see the patient back sooner as necessary.  Patient's vitamin D level was low at 12.7.  Patient ran out of vitamin D over a year ago.  I have encouraged patient to  vitamin D  - Lipid Panel; Future  - AST (SGOT) [E]; Future  - ALT(SGPT) [E]; Future    3. Vitamin D deficiency  Patient's vitamin D level is low at 12.7.  Patient ran out of her vitamin D supplements over a year ago.  I have encouraged the patient to  vitamin D over-the-counter and start taking 2000 units orally daily.  I have placed an order in the system for the patient have a repeat vitamin D level in 2 months.    - Vitamin D; Future    4. Fatigue, unspecified type  Stable.  CPM.  Patient is getting back  into her normal routine now.  Patient is been under much stress over the past 2 years.    - ELECTROCARDIOGRAM, COMPLETE    5. Screening mammogram for breast cancer  Patient requested and was given an order for her screening mammogram..     - [facility] TICO 2D+3D SCREENING BILAT (CPT=77067/74861); Future      Juan Esquivel MD  3/7/2024  1:57 PM

## 2024-03-19 ENCOUNTER — HOSPITAL ENCOUNTER (OUTPATIENT)
Dept: MAMMOGRAPHY | Age: 59
Discharge: HOME | End: 2024-03-19
Attending: INTERNAL MEDICINE
Payer: COMMERCIAL

## 2024-03-19 DIAGNOSIS — Z12.31 SCREENING MAMMOGRAM FOR BREAST CANCER: ICD-10-CM

## 2024-03-19 PROCEDURE — 77063 BREAST TOMOSYNTHESIS BI: CPT | Performed by: INTERNAL MEDICINE

## 2024-03-19 PROCEDURE — 77067 SCR MAMMO BI INCL CAD: CPT | Performed by: INTERNAL MEDICINE

## 2024-03-20 ENCOUNTER — TELEPHONE (OUTPATIENT)
Dept: INTERNAL MEDICINE CLINIC | Facility: CLINIC | Age: 59
End: 2024-03-20

## 2024-03-21 NOTE — TELEPHONE ENCOUNTER
Please notify patient that her recent mammogram has turned out well.  There is no sign of cancer.  I will forward this message to nursing.  Thank you!!

## 2024-03-21 NOTE — TELEPHONE ENCOUNTER
Spoke with patient and relayed MD's message. Verbalized understanding and agreement with plan.

## 2024-08-12 ENCOUNTER — TELEPHONE (OUTPATIENT)
Facility: CLINIC | Age: 59
End: 2024-08-12

## 2024-11-05 ENCOUNTER — TELEPHONE (OUTPATIENT)
Age: 59
End: 2024-11-05
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
## 2025-04-15 NOTE — PATIENT INSTRUCTIONS
Monitor blood pressure at home   Start Losartan 50mg daily  Monitor salt and caffeine intake  Continue exercise   Methods to manage anxiety including: journaling, exercise, meditation, and deep breathing techniques.  Obtain labs and imaging as discussed   Follow up with gastroenterologist for repeat colonoscopy

## 2025-04-21 NOTE — TELEPHONE ENCOUNTER
----- Message from Nicole Carlos sent at 4/20/2025  6:36 AM CDT -----  HPV from pap smear is negative   Chlamydia/Gonorrhea are negative  ----- Message -----  From: Lab, Background User  Sent: 4/16/2025  10:44 AM CDT  To: Nicole Carlos MD

## 2025-04-25 NOTE — TELEPHONE ENCOUNTER
----- Message from Nicole Carlos sent at 4/25/2025  4:26 AM CDT -----  Pap smear is negative   ----- Message -----  From: Lab, Background User  Sent: 4/16/2025  10:44 AM CDT  To: Nicole Carlos MD

## 2025-04-26 NOTE — TELEPHONE ENCOUNTER
----- Message from Nicole Carlos sent at 4/25/2025  7:59 PM CDT -----  CBC shows no anemia, inflammation or infection  CMP shows normal electrolytes, kidney function and liver enzymes  Hemoglobin A1C shows  pre-diabetes, recommend low carb/low sugar diet  Thyroid function is normal  Lipid panel shows elevated total cholesterol and LDL (bad cholesterol). Continue low fat diet, decrease fried foods. Repeat lab in 3-6 months  Vitamin D level is low, start weekly vitamin D supplements for 8 weeks then 2000iu otc daily  Hepatitis C, syphilis and HIV are normal    ----- Message -----  From: Lab, Background User  Sent: 4/17/2025  11:13 AM CDT  To: Nicole Carlos MD

## 2025-04-26 NOTE — TELEPHONE ENCOUNTER
Future Appointments   Date Time Provider Department Center   4/26/2025  2:40 PM PF [facility]2 PF M[facility] [place]   4/29/2025  8:00 AM Nicole Carlos MD [facility] [place]   9/5/2025  2:30 PM Renny Patel MD [facility] SUB GI

## 2025-04-29 NOTE — PROGRESS NOTES
Chief Complaint   Patient presents with    Follow - Up     Medication follow up          HPI  Zandra Amaya is a 59 year old F pt who presents today for blood pressure follow up    Home pressure: 120-130s/ 80-90s  Reports rash and lip swelling with medication   Denies chest pain, SOB, headache, dizziness or blurry vision  Denies tongue swelling or difficulty swallowing    Weight loss: healthy diet  Exercise :strength training   Interested in weight loss medication        ROS  As per HPI    Past Medical History[1]    Past Surgical History[2]    Social Hx on file[3]    Family History[4]     Medications Ordered Prior to Encounter[5]      Objective  Vitals:    04/29/25 0813   BP: 138/78   Pulse: 73   Resp: 16   Temp: 97.2 °F (36.2 °C)   SpO2: 100%   Weight: 176 lb 6.4 oz (80 kg)   Height: 5' 4\" (1.626 m)     Body mass index is 30.28 kg/m².    Physical Exam  Constitutional:       Appearance: Normal appearance.   HEENT:      Head: Normocephalic and atraumatic.      Eyes: normal     Ears: normal on observation     Nose: Nose normal.      Mouth: Mucous membranes are moist. No tongue swelling     Neck: no masses or swelling  Cardiovascular:      Rate and Rhythm: Normal rate and regular rhythm.   Pulmonary:      Effort: Pulmonary effort is normal.      Breath sounds: Normal breath sounds.   Musculoskeletal:         General: Normal range of motion.   Skin:     General: Skin is warm and dry.   Neurological:      General: No focal deficit present.      Mental Status: Alert and oriented to person, place, and time.   Psychiatric:         Mood and Affect: Mood normal.         Thought Content: Thought content normal.       Assessment and Plan  Zandra was seen today for follow - up.    Diagnoses and all orders for this visit:    Primary hypertension  -     amLODIPine 5 MG Oral Tab; Take 1 tablet (5 mg total) by mouth daily.    BMI 30.0-30.9,adult  -     topiramate (TOPAMAX) 25 MG Oral Tab; Take 1 tablet (25 mg total) by  mouth daily.    Monitor blood pressure at home, keep readings  Stop Losartan d/t SE, start amlodipine 5mg daily  Monitor salt, caffeine intake  Start topiramate for weight loss along with healthy diet and exercise  Return in 2 weeks      Follow up  Return in about 2 weeks (around 2025) for medication follow up.      Patient Instructions  Patient Instructions   .Monitor blood pressure at home, keep readings  Take all medication as prescribed   Monitor salt, caffeine intake  Start topiramate for weight loss along with healthy diet and exercise  Return in 2 weeks       Nicole Carlos MD          [1]   Past Medical History:   Abnormal findings on diagnostic imaging of breast    Allergic rhinitis    Chronic left-sided low back pain without sciatica    Essential hypertension    /100    Fatigue    High blood pressure    no meds    High cholesterol    Hyperlipidemia    Bad cholesterol is 300    Menometrorrhagia    Obesity    Visual impairment   [2]   Past Surgical History:  Procedure Laterality Date    Appendectomy      Appendectomy  5/10/1975    Biopsy/removal, lymph node(s)  2022    Colonoscopy N/A 2021    Procedure: COLONOSCOPY;  Surgeon: Natanael Bermeo MD;  Location: [facility] ENDOSCOPY    Endometrial ablation      Novasure      1988    Tubal ligation     [3]   Social History  Socioeconomic History    Marital status:    Tobacco Use    Smoking status: Never    Smokeless tobacco: Never   Vaping Use    Vaping status: Never Used   Substance and Sexual Activity    Alcohol use: Not Currently     Alcohol/week: 3.0 standard drinks of alcohol     Types: 3 Glasses of wine per week    Drug use: No    Sexual activity: Yes     Partners: Male   Other Topics Concern    Caffeine Concern Yes     Comment: coffee-2 cups/day   [4]   Family History  Problem Relation Age of Onset    Asthma Mother     Hyperlipidemia Father    [5]   Current Outpatient Medications on File Prior to Visit   Medication Sig  Dispense Refill    ergocalciferol 1.25 MG (01530 UT) Oral Cap Take 1 capsule (50,000 Units total) by mouth once a week. 4 capsule 1    atorvastatin (LIPITOR) 20 MG Oral Tab Take 1 tablet (20 mg total) by mouth nightly. 90 tablet 3    PEG 3350-KCl-Na Bicarb-NaCl 420 g Oral Recon Soln Take as directed by physician 4000 mL 0     No current facility-administered medications on file prior to visit.

## 2025-05-13 NOTE — PROGRESS NOTES
Chief Complaint   Patient presents with    Blood Pressure     BP follow up          [facility] [place]  Zandra Amaya is a 59 year old F pt who presents today for medication follow up    Taking amlodipine 5mg daily  Home pressure: 120-130s/ 80-90s  No med SE  Denies chest pain, SOB, headache, dizziness or blurry vision    Taking topiramate 25mg daily  No med SE  Lost 3lbs since last visit  Diet: low carb/low sugar, no wine  Exercise: 50min elliptical, low impact aerobic, stretching, 6x/week      ROS  As per HPI    Past Medical History[1]    Past Surgical History[2]    Social Hx on file[3]    Family History[4]     Medications Ordered Prior to Encounter[5]      Objective  Vitals:    05/13/25 0844   BP: 126/84   Pulse: 62   Resp: 16   Temp: 96.9 °F (36.1 °C)   SpO2: 95%   Weight: 173 lb 12.8 oz (78.8 kg)   Height: 5' 4\" (1.626 m)     Body mass index is 29.83 kg/m².    Physical Exam  Constitutional:       Appearance: Normal appearance.   HEENT:      Head: Normocephalic and atraumatic.   Cardiovascular:      Rate and Rhythm: Normal rate and regular rhythm.   Pulmonary:      Effort: Pulmonary effort is normal.      Breath sounds: Normal breath sounds.   Musculoskeletal:         General: Normal range of motion.   Skin:     General: Skin is warm and dry.   Neurological:      General: No focal deficit present.      Mental Status: Alert and oriented to person, place, and time.   Psychiatric:         Mood and Affect: Mood normal.         Thought Content: Thought content normal.       Assessment and Plan  Zandra was seen today for blood pressure.    Diagnoses and all orders for this visit:    Primary hypertension  -     amLODIPine 5 MG Oral Tab; Take 1 tablet (5 mg total) by mouth daily.    Overweight with body mass index (BMI) 25.0-29.9  -     topiramate (TOPAMAX) 25 MG Oral Tab; Take 1 tablet (25 mg total) by mouth daily.       Continue weight loss efforts, healthy diet and exercise   Continue amlodipine 5mg daily and monitor blood  pressure   Lost 3lbs since last visit, continue topamax as prescribed for weight loss  Return for medication follow up in 3 months or as needed   Pt verbalizes understanding, all questions/concerns addressed, in agreement w/plan      Follow up  Return in about 3 months (around 2025) for medication follow up and weight.      Patient Instructions  Patient Instructions   Continue weight loss efforts, healthy diet and exercise   Continue amlodipine 5mg daily and monitor blood pressure   Continue topamax as prescribed for weight loss  Return for medication follow up in 3 months or as needed       Nicole Carlos MD          [1]   Past Medical History:   Abnormal findings on diagnostic imaging of breast    Allergic rhinitis    Chronic left-sided low back pain without sciatica    Essential hypertension    /100    Fatigue    High blood pressure    no meds    High cholesterol    Hyperlipidemia    Bad cholesterol is 300    Menometrorrhagia    Obesity    Visual impairment   [2]   Past Surgical History:  Procedure Laterality Date    Appendectomy      Appendectomy  5/10/1975    Biopsy/removal, lymph node(s)  2022    Colonoscopy N/A 2021    Procedure: COLONOSCOPY;  Surgeon: Natanael Bermeo MD;  Location: [facility] ENDOSCOPY    Endometrial ablation      Novasure      1988    Tubal ligation     [3]   Social History  Socioeconomic History    Marital status:    Tobacco Use    Smoking status: Never    Smokeless tobacco: Never   Vaping Use    Vaping status: Never Used   Substance and Sexual Activity    Alcohol use: Not Currently     Alcohol/week: 3.0 standard drinks of alcohol     Types: 3 Glasses of wine per week    Drug use: No    Sexual activity: Yes     Partners: Male   Other Topics Concern    Caffeine Concern Yes     Comment: coffee-2 cups/day   [4]   Family History  Problem Relation Age of Onset    Asthma Mother     Hyperlipidemia Father    [5]   Current Outpatient Medications on File Prior  to Visit   Medication Sig Dispense Refill    ergocalciferol 1.25 MG (94876 UT) Oral Cap Take 1 capsule (50,000 Units total) by mouth once a week. 4 capsule 1    atorvastatin (LIPITOR) 20 MG Oral Tab Take 1 tablet (20 mg total) by mouth nightly. 90 tablet 3    PEG 3350-KCl-Na Bicarb-NaCl 420 g Oral Recon Soln Take as directed by physician 4000 mL 0     No current facility-administered medications on file prior to visit.

## 2025-05-13 NOTE — PATIENT INSTRUCTIONS
Continue weight loss efforts, healthy diet and exercise   Continue amlodipine 5mg daily and monitor blood pressure   Continue topamax as prescribed for weight loss  Return for medication follow up in 3 months or as needed

## 2025-05-22 NOTE — TELEPHONE ENCOUNTER
Called patient  When she saw Dr Carlos on 5/13 had some facial redness-now it is worsening  States it is painful and itchy-thinks dermatitis  Lips are swollen, eyes are swollen and puffy  No shortness of breath, shortness of breath or difficulty swallowing  No tongue swelling  Advised needs to go to IC today  States she works today  Gave IC hours and insisted she be seen today and not wait until tomorrow  Patient verbalized understanding, but wants to keep her appointment for tomorrow.      Future Appointments   Date Time Provider Department Center   5/23/2025 10:40 AM Nicole Carlos MD [facility] [place]   8/5/2025  8:00 AM Nicole Carlos MD [facility] [place]   9/5/2025  2:30 PM Renny Patel MD [facility]

## 2025-05-22 NOTE — TELEPHONE ENCOUNTER
Patient scheduled on my chart for My face inflammation and rush with redness around my lips and eyes.  Ok to wait till appointment   Future Appointments   Date Time Provider Department Center   5/27/2025  8:20 AM Nicole Carlos MD [facility] [place]   8/5/2025  8:00 AM Nicole Carlos MD [facility] [place]   9/5/2025  2:30 PM Renny Patel MD [facility] SUB GI

## 2025-05-23 NOTE — TELEPHONE ENCOUNTER
Nicole Carlos MD to [facility] Clinical Staff (Selected Message)        5/23/25  6:04 AM  Noted. Recommend IC if still having swelling     Future Appointments   Date Time Provider Department Center   5/27/2025  8:00 AM Nicole Carlos MD [facility] [place]   8/5/2025  8:00 AM Nicole Carlos MD [facility] [place]   9/5/2025  2:30 PM Renny Patel MD [facility] SUB GI      Patient states she is trying over the counter cream for eczema and symptoms have improved  Advised that if continues to improve will cancel appointment for next week. Told patient that if symptoms fail to improve or worsen go to IC. Verbalized understanding.

## 2025-06-17 NOTE — TELEPHONE ENCOUNTER
AMLODIPINE BESYLATE 5 MG TAB 05/26/2025 05/13/2025  90 each  90 Nicole Carlos MD   Last refill 5/13/25 90 0 refill  This request refused

## (undated) DIAGNOSIS — R59.9 ENLARGED LYMPH NODE: Primary | ICD-10-CM

## (undated) DEVICE — SUT PROL 4-0 18IN N ABSRB BLU L19MM FS-2

## (undated) DEVICE — APPLICATOR PREP 26ML CHG 2% ISO ALC 70%

## (undated) DEVICE — KIT CLEAN ENDOKIT 1.1OZ GOWNX2

## (undated) DEVICE — FORCEP RADIAL JAW 4

## (undated) DEVICE — 35 ML SYRINGE REGULAR TIP: Brand: MONOJECT

## (undated) DEVICE — SNARE OPTMZ PLPCTM TRP

## (undated) DEVICE — SUT PERMA- 2-0 18IN FS NABSRB BLK 26MM 3/8

## (undated) DEVICE — SUT PERMA- 2-0 18IN NABSRB BLK TIE SILK

## (undated) DEVICE — LINE MNTR ADLT SET O2 INTMD

## (undated) DEVICE — PACK CDS HEAD

## (undated) DEVICE — ENCORE® LATEX ACCLAIM SIZE 8, STERILE LATEX POWDER-FREE SURGICAL GLOVE: Brand: ENCORE

## (undated) DEVICE — SUT ETHLN 4-0 18IN P-3 NABSRB BLK 13MM 3/8 CI

## (undated) DEVICE — SUCTION CANISTER, 3000CC,SAFELINER: Brand: DEROYAL

## (undated) DEVICE — SNARE CAPTIFLEX MICRO-OVL OLY

## (undated) DEVICE — SOLUTION IRRIG 1000ML 0.9% NACL USP BTL

## (undated) DEVICE — REM POLYHESIVE ADULT PATIENT RETURN ELECTRODE: Brand: VALLEYLAB

## (undated) DEVICE — GOWN SURG XL DISP LEV 3 AERO BLU RAGLAN SL

## (undated) DEVICE — NECK ACCESSORY: Brand: MEDLINE INDUSTRIES, INC.

## (undated) DEVICE — MEDI-VAC NON-CONDUCTIVE SUCTION TUBING 6MM X 1.8M (6FT.) L: Brand: CARDINAL HEALTH

## (undated) DEVICE — SUT CHRM GUT 3-0 27IN SH ABSRB UD 26MM 1/2

## (undated) DEVICE — KIT ENDO ORCAPOD 160/180/190

## (undated) NOTE — LETTER
Date & Time: 6/8/2021, 11:21 AM  Patient: Preet Fang  Encounter Provider(s):    COURTNEY Rubio       To Whom It May Concern:    Ben Mehta was seen and treated in our department on 6/8/2021.  She should not return to work until  cl

## (undated) NOTE — LETTER
8/12/2024    Zandra Amaya        [address] [facility] [address]            Dear Zandra Amaya,      Our records indicate that you are due for an appointment for a Colonoscopy with Natanael Bermeo MD. Our doctors are booking out about 3-6 months in advance for procedures.     Please call our office to schedule a phone screening appointment to plan for the procedure(s).   Your medical well-being is important to us.    If your insurance requires a referral, please call your primary care office to request one.      Thank you,      The Physicians and Staff at [facility]

## (undated) NOTE — MR AVS SNAPSHOT
[place]  [facility] [address] [address]  [phone]               Thank you for choosing us for your health care visit with Iris Nugent MD.  We are glad to serve you and happy to provide you with this summary of your visit.   Pl [employer]     Call the [employer] for assistance with your inactive [employer] account    If you have questions, you can call [phone] to talk to our [facility] Staff. Remember, [employer] is NOT to be used for urgent needs.   For medical emergencies Choose whole grain products Foods high in sodium   Water is best for hydration Fast food.    Eat at home when possible     Tips for increasing your physical activity – Adults who are physically active are less likely to develop some chronic diseases than ad

## (undated) NOTE — LETTER
AUTHORIZATION FOR SURGICAL OPERATION OR OTHER PROCEDURE    1. I hereby authorize Dr. Keren Mercado , and [place] [employer] [facility] staff assigned to my case to perform the following operation and/or procedure at the [facility]:    FNA of  Left side neck under jaw  _______________________________________________________________________________________________      _______________________________________________________________________________________________    2. My physician has explained the nature and purpose of the operation or other procedure, possible alternative methods of treatment, the risks involved, and the possibility of complication to me. I acknowledge that no guarantee has been made as to the result that may be obtained. 3.  I recognize that, during the course of this operation, or other procedure, unforseen conditions may necessitate additional or different procedure than those listed above. I, therefore, further authorize and request that the above named physician, his/her physician assistants or designees perform such procedures as are, in his/her professional opinion, necessary and desirable. 4.  Any tissue or organs removed in the operation or other procedure may be disposed of by and at the discretion of the [facility], [facility] and [facility] AT [facility]. 5.  I understand that in the event of a medical emergency, I will be transported by local paramedics to [facility] or other hospital emergency department. 6.  I certify that I have read and fully understand the above consent to operation and/or other procedure. 7.  I acknowledge that my physician has explained sedation/analgesia administration to me including the risks and benefits. I consent to the administration of sedation/analgesia as may be necessary or desirable in the judgement of my physician.     Witness signature: ___________________________________________________ Date:  ______/______/_____ Time:  ________ A. M.  P.M. Patient Name:  ______________________________________________________  (please print)      Patient signature:  ___________________________________________________             Relationship to Patient:           []  Parent    Responsible person                          []  Spouse  In case of minor or                    [] Other  _____________   Incompetent name:  __________________________________________________                               (please print)      _____________      Responsible person  In case of minor or  Incompetent signature:  _______________________________________________    Statement of Physician  My signature below affirms that prior to the time of the procedure, I have explained to the patient and/or his/her guardian, the risks and benefits involved in the proposed treatment and any reasonable alternative to the proposed treatment. I have also explained the risks and benefits involved in the refusal of the proposed treatment and have answered the patient's questions.                         Date:  ______/______/_______  Provider                      Signature:  __________________________________________________________       Time:  ___________ A.M    P.M.

## (undated) NOTE — LETTER
[facility]  Administration of Anesthesia  1.  Kymberly Cabrera, or _________________________________ acting on her behalf, (Patient) (Dependent/Representative) request to receive anesthesia for my pending procedure/operation/treatm spinal bleeding, seizure, cardiac arrest and death. 7. AWARENESS: I understand that it is possible (but unlikely) to have explicit memory of events from the operating room while under general anesthesia.   8. ELECTROCONVULSIVE THERAPY PATIENTS: This consen signature below affirms that prior to the time of the procedure, I have explained to the patient and/or his/her guardian, the risks and benefits of undergoing anesthesia, as well as any reasonable alternatives.     __________________________________________

## (undated) NOTE — LETTER
4/27/2018              [address] [place] [address]         Dear Pastor Ellis,    It was a pleasure to see you at our [address] [facility] office. Your PAP and HPV were negative.  I look forward

## (undated) NOTE — LETTER
5/23/2022          To Whom It May Concern:    Donovan Chanel is currently under my medical care and may return to work as of May 24, 2022. Activity is restricted as follows: No restrictions, may return to her full duties. If you require additional information please contact our office.         Sincerely,    Marlena Bennett MD